# Patient Record
Sex: MALE | Race: BLACK OR AFRICAN AMERICAN
[De-identification: names, ages, dates, MRNs, and addresses within clinical notes are randomized per-mention and may not be internally consistent; named-entity substitution may affect disease eponyms.]

---

## 2017-03-07 ENCOUNTER — HOSPITAL ENCOUNTER (INPATIENT)
Dept: HOSPITAL 74 - YASAS | Age: 47
LOS: 1 days | Discharge: HOME | End: 2017-03-08
Attending: INTERNAL MEDICINE | Admitting: INTERNAL MEDICINE
Payer: COMMERCIAL

## 2017-03-07 VITALS — BODY MASS INDEX: 23.4 KG/M2

## 2017-03-07 DIAGNOSIS — F10.230: Primary | ICD-10-CM

## 2017-03-07 DIAGNOSIS — Z86.79: ICD-10-CM

## 2017-03-07 DIAGNOSIS — Z96.641: ICD-10-CM

## 2017-03-07 DIAGNOSIS — F90.9: ICD-10-CM

## 2017-03-07 DIAGNOSIS — Z87.11: ICD-10-CM

## 2017-03-07 DIAGNOSIS — Z95.5: ICD-10-CM

## 2017-03-07 DIAGNOSIS — F17.210: ICD-10-CM

## 2017-03-07 DIAGNOSIS — I10: ICD-10-CM

## 2017-03-07 DIAGNOSIS — F19.24: ICD-10-CM

## 2017-03-07 DIAGNOSIS — F91.8: ICD-10-CM

## 2017-03-07 DIAGNOSIS — I50.42: ICD-10-CM

## 2017-03-07 DIAGNOSIS — K21.9: ICD-10-CM

## 2017-03-07 DIAGNOSIS — Z86.2: ICD-10-CM

## 2017-03-07 LAB
APPEARANCE UR: CLEAR
BILIRUB UR STRIP.AUTO-MCNC: NEGATIVE MG/DL
COLOR UR: (no result)
KETONES UR QL STRIP: NEGATIVE
LEUKOCYTE ESTERASE UR QL STRIP.AUTO: NEGATIVE
MUCOUS THREADS URNS QL MICRO: (no result)
NITRITE UR QL STRIP: NEGATIVE
PH UR: 6 [PH] (ref 5–8)
PROT UR QL STRIP: (no result)
PROT UR QL STRIP: NEGATIVE
RBC # BLD AUTO: <1 /HPF (ref 0–3)
RBC # UR STRIP: NEGATIVE /UL
SP GR UR: 1.02 (ref 1–1.03)
UROBILINOGEN UR STRIP-MCNC: NEGATIVE E.U./DL (ref 0.2–1)
WBC # UR AUTO: 2 /HPF (ref 3–5)

## 2017-03-07 PROCEDURE — HZ2ZZZZ DETOXIFICATION SERVICES FOR SUBSTANCE ABUSE TREATMENT: ICD-10-PCS | Performed by: INTERNAL MEDICINE

## 2017-03-07 RX ADMIN — CARVEDILOL SCH: 25 TABLET, FILM COATED ORAL at 23:14

## 2017-03-07 RX ADMIN — CARVEDILOL SCH MG: 25 TABLET, FILM COATED ORAL at 14:53

## 2017-03-07 NOTE — CONSULT
[facility] Psychiatric Consult





- Data


Date of interview: 03/08/17


Admission source: [facility]


Identifying data: This is 47 years old male, AMBULATING WITH CANE,  with 

psychiatric hospitalization history intoxicated with;.  Alcohol and Nicotine


Substance Abuse History: Smoking Cessation.  Smoking history: Current every day 

smoker.  Have you smoked in the past 12 months: Yes.  Aproximately how many 

cigarettes per day: 10.  Hx Chewing Tobacco Use: No.  Initiated information on 

smoking cessation: Yes.  'Breaking Loose' booklet given: 03/07/17.  - Substance 

& Tx. History.  Hx Alcohol Use: Yes.  Hx Substance Use: No.  Substance Use Type

: Alcohol.  Hx Substance Use Treatment: Yes (detox).  - Substances Abused.  ** 

Alcohol-beer/vodka.  Route: Oral.  Frequency: Daily.  Amount used: 2-6 pks./1 

pt.  Age of first use: 8.  Date of Last Use: 03/07/17


Medical History: CHF, HTN, lower extremities injures


Psychiatric History: Patient reports history of depressionl preoccupied with 

psychiatric medications, reports taking prior to admission:  Elavil 25mg po9 

bid.  Lexapro 20mg poqd.  Gabapentin 400mg po bid.  [id number] po qhs.  

Trazodone 200mg po qhs


Physical/Sexual Abuse/Trauma History: Denies


Additional Comment: Elavil 25mg po9 bid.  Lexapro 20mg poqd.  Gabapentin 400mg 

po bid.  [id number] po qhs.  Trazodone 200mg po qhs





Mental Status Exam





- Mental Status Exam


Alert and Oriented to: Person


Cognitive Function: Fair


Patient Appearance: Unkempt


Mood: Anxious


Affect: Mood Congruent


Patient Behavior: Impulsive, Talkative


Speech Pattern: Excessive


Voice Loudness: Mildly Loud


Thought Process: Circumstantial, Goal Oriented


Thought Disorder: Being Controlled


Hallucinations: Denies


Suicidal Ideation: Denies


Homicidal Ideation: Denies


Insight/Judgement: Fair


Sleep: Difficulty falling asleep


Appetite: Weight loss


Muscle strength/Tone: Mild Hypotonicity


Gait/Station: Hemiparetic


Additional Comments: Elavil 25mg po9 bid.  Lexapro 20mg poqd.  Gabapentin 400mg 

po bid.  [id number] po qhs.  Trazodone 200mg po qhs





Psychiatric Findings





- Problem List (Axis 1, 2,3)


(1) Alcohol dependence with uncomplicated withdrawal


Current Visit: Yes   Status: Acute





(2) Nicotine dependence


Current Visit: Yes   Status: Acute   





(3) Drug-induced mood disorder


Current Visit: Yes   Status: Acute





(4) ADHD (attention deficit hyperactivity disorder)


Current Visit: Yes   Status: Acute   








- Initial Treatment Plan


Initial Treatment Plan: Elavil 25mg po9 bid.  Lexapro 20mg poqd.  Gabapentin 

400mg po bid.  [id number] po qhs.  Trazodone 200mg po qhs

## 2017-03-07 NOTE — HP
CIWA Score





- CIWA Score


Nausea/Vomitin-No Nausea/No Vomiting


Muscle Tremors: 4-Moderate,w/Arms Extend


Anxiety: 3


Agitation: 4-Moderately Restless


Paroxysmal Sweats: 3


Orientation: 0-Oriented


Tacttile Disturbances: 0-None


Auditory Disturbances: 0-None


Visual Disturbances: 0-None


Headache: 0-None Present


CIWA-Ar Total Score: 14





Admission ROS [facility]





- HPI


Chief Complaint: 


Withdrawal sx.


Allergies/Adverse Reactions: 


 Allergies











Allergy/AdvReac Type Severity Reaction Status Date / Time


 


tomato Allergy Severe Swelling Verified 17 10:23


 


SEAFOOD Allergy Severe Hives Uncoded 17 10:23


 


NKDA Allergy   Uncoded 17 11:25











History of Present Illness: 


46 y/o man with a long hx. of alcoholism is admitted for detox. Pt. has been in 

previous detox,he reports 6 yrs. sober.


Exam Limitations: No Limitations





- Ebola screening


Have you traveled outside of the country in the last 21 days: No


Have you had contact with anyone from an Ebola affected area: No


Have you been sick,other than usual withdrawal symptoms: No


Do you have a fever: No





- Review of Systems


Constitutional: Diaphoresis


EENT: reports: No Symptoms Reported


Respiratory: reports: No Symptoms reported


Cardiac: reports: Palpitations (pulse 110)


GI: reports: Nausea, Abdominal cramping


: reports: No Symptoms Reported


Musculoskeletal: reports: Back Pain, Joint Pain, Muscle Pain


Integumentary: reports: Sweating


Neuro: reports: Tremors


Endocrine: reports: No Symptoms Reported


Hematology: reports: No Symptoms Reported


Psychiatric: reports: No Sypmtoms Reported


Other Systems: Reviewed and Negative





Patient History





- Patient Medical History


Hx Anemia: Yes (was on feosol)


Hx Asthma: No


Hx Chronic Obstructive Pulmonary Disease (COPD): No


Hx Cancer: No


Hx Cardiac Disorders: Yes (stent in the descending aorta because of aneurysm)


Hx Congestive Heart Failure: Yes


Hx Hypertension: Yes


Hx Hypercholesterolemia: Yes (no meds)


Hx Pacemaker: No


HX Cerebrovascular Accident: No


Hx Seizures: No


Hx Dementia: No


Hx Diabetes: No


Hx Gastrointestinal Disorders: Yes (stomach ulcer/acid reflux)


Hx Liver Disease: No


Hx Genitourinary Disorders: No


Hx Sexually Transmitted Disorders: No


Hx Renal Disease (ESRD): No


Hx Thyroid Disease: No


Hx Human Immunodeficiency Virus (HIV): No


Hx Hepatitis C: No


Hx Depression: Yes (lexapro)


Hx Suicide Attempt: No


Hx Bipolar Disorder: No


Hx Schizophrenia: No





- Patient Surgical History


Past Surgical History: Yes


Hx Neurologic Surgery: No


Hx Cataract Extraction: No


Hx Cardiac Surgery: Yes (Stent in descending aorta)


Hx Lung Surgery: No


Hx Breast Surgery: No


Hx Breast Biopsy: No


Hx Abdominal Surgery: No


Hx Appendectomy: No


Hx Cholecystectomy: No


Hx Genitourinary Surgery: No


Hx  Section: No


Hx Orthopedic Surgery: Yes (left hip replacement, right knee/leg, right/arm/

right small toe x2)


Anesthesia Reaction: No





- PPD History


Previous Implant?: Yes


Documented Results: Positive w/o proof


Implanted On Prior [facility] Admission?: No


PPD to be Administered?: No





- Smoking Cessation


Smoking history: Current every day smoker


Have you smoked in the past 12 months: Yes


Aproximately how many cigarettes per day: 10


Hx Chewing Tobacco Use: No


Initiated information on smoking cessation: Yes


'Breaking Loose' booklet given: 17





- Substance & Tx. History


Hx Alcohol Use: Yes


Hx Substance Use: No


Substance Use Type: Alcohol


Hx Substance Use Treatment: Yes (detox)





- Substances Abused


  ** Alcohol-beer/vodka


Route: Oral


Frequency: Daily


Amount used: 2-6 pks./1 pt.


Age of first use: 8


Date of Last Use: 17





Family Disease History





- Family Disease History


Family Disease History: Diabetes: Father (HTN), Heart Disease: Father





Admission Physical Exam [facility]





- Vital Signs


Vital Signs: 


 Vital Signs - 24 hr











  17





  09:49


 


Temperature 97.2 F L


 


Pulse Rate 110 H


 


Respiratory 20





Rate 


 


Blood Pressure 125/92














- Physical


General Appearance: Yes: Tremorous, Sweating, Anxious


HEENTM: Yes: Within Normal Limits


Respiratory: Yes: Chest Non-Tender, Lungs Clear, Normal Breath Sounds


Neck: Yes: Supple


Breast: Yes: Breast Exam Deferred


Cardiology: Yes: Regular Rhythm, Regular Rate, S1, S2


Abdominal: Yes: Normal Bowel Sounds, Non Tender, Soft


Genitourinary: Yes: Within Normal Limits


Back: Yes: Within Normal Limits


Musculoskeletal: Yes: Within Normal Limits


Extremities: Yes: Tremors, Other (multiple surgical scars)


Neurological: Yes: Fully Oriented, Alert


Integumentary: Yes: Diaphoresis


Lymphatic: Yes: Within Normal Limits





- Diagnostic


(1) Alcohol dependence with uncomplicated withdrawal


Current Visit: Yes   Status: Acute





(2) CHF (congestive heart failure)


Current Visit: Yes   Status: Acute   Qualifiers: 


     Congestive heart failure type: combined     Congestive heart failure 

chronicity: chronic        Qualified Code(s): I50.42 - Chronic combined 

systolic (congestive) and diastolic (congestive) heart failure  





(3) HTN (hypertension)


Current Visit: Yes   Status: Acute   Qualifiers: 


     Hypertension type: essential hypertension        Qualified Code(s): I10 - 

Essential (primary) hypertension  








Cleared for Admission [facility]





- Detox or Rehab


[facility] Level of Care: Medically Managed


Detox Regimen/Protocol: Librium





[facility] Breath Alcohol Content


Breath Alcohol Content: 0





Urine Drug Screen





- Results


Drug Screen Negative: Yes

## 2017-03-08 VITALS — DIASTOLIC BLOOD PRESSURE: 93 MMHG | SYSTOLIC BLOOD PRESSURE: 148 MMHG | TEMPERATURE: 98.8 F | HEART RATE: 92 BPM

## 2017-03-08 LAB
ALBUMIN SERPL-MCNC: 3.4 G/DL (ref 3.4–5)
ALP SERPL-CCNC: 121 U/L (ref 45–117)
ALT SERPL-CCNC: 10 U/L (ref 12–78)
ANION GAP SERPL CALC-SCNC: 7 MMOL/L (ref 8–16)
AST SERPL-CCNC: 10 U/L (ref 15–37)
BILIRUB SERPL-MCNC: 0.2 MG/DL (ref 0.2–1)
CALCIUM SERPL-MCNC: 9.4 MG/DL (ref 8.5–10.1)
CO2 SERPL-SCNC: 28 MMOL/L (ref 21–32)
CREAT SERPL-MCNC: 1.5 MG/DL (ref 0.7–1.3)
DEPRECATED RDW RBC AUTO: 18.9 % (ref 11.9–15.9)
GLUCOSE SERPL-MCNC: 42 MG/DL (ref 74–106)
MCH RBC QN AUTO: 24 PG (ref 25.7–33.7)
MCHC RBC AUTO-ENTMCNC: 31.1 G/DL (ref 32–35.9)
MCV RBC: 77.2 FL (ref 80–96)
PLATELET # BLD AUTO: 726 K/MM3 (ref 134–434)
PMV BLD: 7.5 FL (ref 7.5–11.1)
PROT SERPL-MCNC: 7.9 G/DL (ref 6.4–8.2)
SICKLE CELL SCREEN: NEGATIVE
WBC # BLD AUTO: 6.9 K/MM3 (ref 4–10)

## 2017-03-08 NOTE — EKG
Test Reason : 

Blood Pressure : ***/*** mmHG

Vent. Rate : 084 BPM     Atrial Rate : 084 BPM

   P-R Int : 164 ms          QRS Dur : 080 ms

    QT Int : 386 ms       P-R-T Axes : 062 048 014 degrees

   QTc Int : 456 ms

 

NORMAL SINUS RHYTHM

POSSIBLE LEFT ATRIAL ENLARGEMENT

BORDERLINE ECG

NO PREVIOUS ECGS AVAILABLE

Confirmed by FER GREEN MD ([id number]) on 3/8/2017 12:52:07 PM

 

Referred By:             Confirmed By:FER GREEN MD

## 2017-03-08 NOTE — PN
[facility] Progress Note


Note: 


pt became irrate screaming yelling at entire staff non approachable; security 

called pt escorted off the unit; administrative discharge

## 2017-03-08 NOTE — DS
[facility] Detox Discharge Summary


Admission Date: 


03/07/17





Discharge Date: 03/08/17 (non compliant )





- History


Present History: Alcohol Dependence





- Physical Exam Results


Vital Signs: 


 Vital Signs











Temperature  98.8 F   03/08/17 10:11


 


Pulse Rate  92 H  03/08/17 10:11


 


Respiratory Rate  20   03/08/17 10:11


 


Blood Pressure  148/93   03/08/17 10:11


 


O2 Sat by Pulse Oximetry (%)      














- Medication


Discharge Medications: 


Ambulatory Orders





Amitriptyline HCl [Elavil -] 25 mg PO HS 03/07/17 


Amlodipine Besylate [Norvasc -] 10 mg PO DAILY 03/07/17 


Aspirin [ASA -] 81 mg PO DAILY 03/07/17 


Carvedilol [Coreg -] 25 mg PO BID 03/07/17 


Diphenhydramine [Benadryl -] 50 mg PO TID 03/07/17 


Escitalopram Oxalate [Lexapro -] 20 mg PO DAILY 03/07/17 


Gabapentin [Neurontin -] 400 mg PO BID 03/07/17 


Hydralazine HCl [Apresoline -] 25 mg PO TID 03/07/17 


Hydrochlorothiazide [Hctz -] 25 mg PO BID 03/07/17 


Pantoprazole Sodium [Protonix -] 40 mg PO DAILY 03/07/17 


Trazodone HCl [Desyrel -] 100 mg PO HS 03/07/17 


Zolpidem Tartrate [Ambien] 10 mg PO HS 03/07/17 


Amitriptyline HCl [Elavil -] 25 mg PO BID #60 tablet 03/08/17 


Escitalopram Oxalate [Lexapro -] 20 mg PO DAILY #30 tablet 03/08/17 


Gabapentin [Neurontin -] 400 mg PO BID #60 capsule 03/08/17 


Trazodone HCl [Desyrel -] 200 mg PO HS #30 tablet 03/08/17 


Zolpidem Tartrate [Ambien] 10 mg PO HS #14 tablet MDD 10 03/08/17 











- Diagnosis


(1) ADHD (attention deficit hyperactivity disorder)


Current Visit: Yes   Status: Acute   





(2) Alcohol dependence with uncomplicated withdrawal


Current Visit: Yes   Status: Chronic





(3) CHF (congestive heart failure)


Current Visit: Yes   Status: Acute   Qualifiers: 


     Congestive heart failure type: combined     Congestive heart failure 

chronicity: chronic        Qualified Code(s): I50.42 - Chronic combined 

systolic (congestive) and diastolic (congestive) heart failure  





(4) Drug-induced mood disorder


Current Visit: Yes   Status: Acute





(5) HTN (hypertension)


Current Visit: Yes   Status: Chronic   Qualifiers: 


     Hypertension type: essential hypertension        Qualified Code(s): I10 - 

Essential (primary) hypertension  





(6) Nicotine dependence


Current Visit: Yes   Status: Chronic   Qualifiers: 


     Nicotine product type: cigarettes     Substance use status: uncomplicated 

       Qualified Code(s): F17.210 - Nicotine dependence, cigarettes, 

uncomplicated  








- AMA


Did Patient Leave Against Medical Advice: No

## 2018-03-09 ENCOUNTER — HOSPITAL ENCOUNTER (INPATIENT)
Dept: HOSPITAL 74 - YASAS | Age: 48
LOS: 4 days | Discharge: ANOTHER HOSPITAL | DRG: 773 | End: 2018-03-13
Attending: INTERNAL MEDICINE | Admitting: INTERNAL MEDICINE
Payer: COMMERCIAL

## 2018-03-09 VITALS — BODY MASS INDEX: 20.3 KG/M2

## 2018-03-09 DIAGNOSIS — F32.9: ICD-10-CM

## 2018-03-09 DIAGNOSIS — E78.00: ICD-10-CM

## 2018-03-09 DIAGNOSIS — D72.829: ICD-10-CM

## 2018-03-09 DIAGNOSIS — Z86.79: ICD-10-CM

## 2018-03-09 DIAGNOSIS — I50.9: ICD-10-CM

## 2018-03-09 DIAGNOSIS — Z96.642: ICD-10-CM

## 2018-03-09 DIAGNOSIS — G47.00: ICD-10-CM

## 2018-03-09 DIAGNOSIS — F10.230: Primary | ICD-10-CM

## 2018-03-09 DIAGNOSIS — I10: ICD-10-CM

## 2018-03-09 DIAGNOSIS — F12.20: ICD-10-CM

## 2018-03-09 DIAGNOSIS — Z91.013: ICD-10-CM

## 2018-03-09 DIAGNOSIS — F11.20: ICD-10-CM

## 2018-03-09 DIAGNOSIS — M06.9: ICD-10-CM

## 2018-03-09 DIAGNOSIS — D64.9: ICD-10-CM

## 2018-03-09 DIAGNOSIS — Z59.0: ICD-10-CM

## 2018-03-09 DIAGNOSIS — F17.210: ICD-10-CM

## 2018-03-09 DIAGNOSIS — F19.24: ICD-10-CM

## 2018-03-09 DIAGNOSIS — Z95.5: ICD-10-CM

## 2018-03-09 LAB
ALBUMIN SERPL-MCNC: 3.7 G/DL (ref 3.4–5)
ALP SERPL-CCNC: 132 U/L (ref 45–117)
ALT SERPL-CCNC: 7 U/L (ref 12–78)
ANION GAP SERPL CALC-SCNC: 10 MMOL/L (ref 8–16)
APPEARANCE UR: (no result)
AST SERPL-CCNC: 13 U/L (ref 15–37)
BACTERIA #/AREA URNS HPF: (no result) /HPF
BILIRUB SERPL-MCNC: 0.4 MG/DL (ref 0.2–1)
BILIRUB UR STRIP.AUTO-MCNC: NEGATIVE MG/DL
BUN SERPL-MCNC: 27 MG/DL (ref 7–18)
CALCIUM SERPL-MCNC: 8.8 MG/DL (ref 8.5–10.1)
CHLORIDE SERPL-SCNC: 102 MMOL/L (ref 98–107)
CO2 SERPL-SCNC: 25 MMOL/L (ref 21–32)
COLOR UR: YELLOW
CREAT SERPL-MCNC: 2.4 MG/DL (ref 0.7–1.3)
DEPRECATED RDW RBC AUTO: 20.1 % (ref 11.9–15.9)
EPITH CASTS URNS QL MICRO: (no result) /HPF
GLUCOSE SERPL-MCNC: 72 MG/DL (ref 74–106)
HCT VFR BLD CALC: 33.6 % (ref 35.4–49)
HGB BLD-MCNC: 11 GM/DL (ref 11.7–16.9)
KETONES UR QL STRIP: NEGATIVE
LEUKOCYTE ESTERASE UR QL STRIP.AUTO: NEGATIVE
MCH RBC QN AUTO: 24.7 PG (ref 25.7–33.7)
MCHC RBC AUTO-ENTMCNC: 32.7 G/DL (ref 32–35.9)
MCV RBC: 75.4 FL (ref 80–96)
MUCOUS THREADS URNS QL MICRO: (no result)
NITRITE UR QL STRIP: NEGATIVE
PH UR: 5 [PH] (ref 5–8)
PLATELET # BLD AUTO: 593 K/MM3 (ref 134–434)
PMV BLD: 7.5 FL (ref 7.5–11.1)
POTASSIUM SERPLBLD-SCNC: 4.1 MMOL/L (ref 3.5–5.1)
PROT SERPL-MCNC: 7.6 G/DL (ref 6.4–8.2)
PROT UR QL STRIP: (no result)
PROT UR QL STRIP: NEGATIVE
RBC # BLD AUTO: 4.45 M/MM3 (ref 4–5.6)
RBC # UR STRIP: NEGATIVE /UL
SODIUM SERPL-SCNC: 137 MMOL/L (ref 136–145)
SP GR UR: 1.02 (ref 1–1.03)
UROBILINOGEN UR STRIP-MCNC: NEGATIVE MG/DL (ref 0.2–1)
WBC # BLD AUTO: 5.1 K/MM3 (ref 4–10)

## 2018-03-09 PROCEDURE — HZ2ZZZZ DETOXIFICATION SERVICES FOR SUBSTANCE ABUSE TREATMENT: ICD-10-PCS | Performed by: INTERNAL MEDICINE

## 2018-03-09 RX ADMIN — Medication SCH MG: at 21:27

## 2018-03-09 RX ADMIN — GABAPENTIN SCH MG: 400 CAPSULE ORAL at 21:30

## 2018-03-09 RX ADMIN — TRAZODONE HYDROCHLORIDE SCH MG: 50 TABLET ORAL at 21:31

## 2018-03-09 RX ADMIN — ACETAMINOPHEN PRN MG: 325 TABLET ORAL at 21:28

## 2018-03-09 RX ADMIN — ZOLPIDEM TARTRATE SCH MG: 10 TABLET, FILM COATED ORAL at 21:38

## 2018-03-09 RX ADMIN — CARVEDILOL SCH MG: 25 TABLET, FILM COATED ORAL at 21:31

## 2018-03-09 SDOH — ECONOMIC STABILITY - HOUSING INSECURITY: HOMELESSNESS: Z59.0

## 2018-03-09 NOTE — HP
COWS





- Scale


Resting Pulse: 1= OH 


Sweatin=Flushed/Facial Moisture


Restless Observation: 1= Difficult to Sit Still


Pupil Size: 1= Pupils >than Normal


Bone or Joint Aches: 2= Severe Diffuse Aches


Runny Nose/ Eye Tearin= Nasal Congestion


GI Upset > 30mins: 2= Nausea/Diarrhea


Tremor Observation: 2= Slight Tremor Visible


Yawning Observation: 0= None


Anxiety or Irritability: 1=Feels Anxious/Irritable


Goose Flesh Skin: 0=Smooth Skin


COWS Score: 13





CIWA Score





- CIWA Score


Nausea/Vomiting: 3


Muscle Tremors: 3


Anxiety: 2


Agitation: 2


Paroxysmal Sweats: 2


Orientation: 0-Oriented


Tacttile Disturbances: 2-Mild Itch/Numbness/Burn


Auditory Disturbances: 0-None


Visual Disturbances: 0-None


Headache: 2-Mild


CIWA-Ar Total Score: 16





Admission ROS S





- HPI


Chief Complaint: 





I need to stop using alcohol. 


Allergies/Adverse Reactions: 


 Allergies











Allergy/AdvReac Type Severity Reaction Status Date / Time


 


tomato Allergy Severe Swelling Verified 18 14:12


 


SEAFOOD Allergy Severe Hives Uncoded 18 14:12


 


NKDA Allergy   Uncoded 18 14:12











History of Present Illness: 





Pt with h/o alcohol dependency needs detox.  


Exam Limitations: No Limitations





- Ebola screening


Have you traveled outside of the country in the last 21 days: No (N)


Have you had contact with anyone from an Ebola affected area: No


Have you been sick,other than usual withdrawal symptoms: No


Do you have a fever: No





- Review of Systems


Constitutional: Loss of Appetite, Malaise, Night Sweats, Changes in sleep, 

Unintentional Wgt. Loss


EENT: reports: Blurred Vision, Dental Problems (missing teeth)


Respiratory: reports: No Symptoms reported


Cardiac: reports: Other (chf, s/p descending aorta stent)


GI: reports: Nausea, Poor Appetite, Indigestion


: reports: No Symptoms Reported


Musculoskeletal: reports: Joint Pain, Muscle Pain, Muscle Weakness, Joint 

Stiffness


Integumentary: reports: Dryness, Sweating


Neuro: reports: Headache, Tremors, Weakness


Endocrine: reports: No Symptoms Reported


Hematology: reports: Anemia


Psychiatric: reports: Orientated x3, Anxious, Depressed


Other Systems: Reviewed and Negative





Patient History





- Patient Medical History


Hx Anemia: Yes (was on feosol)


Hx Asthma: No


Hx Chronic Obstructive Pulmonary Disease (COPD): No


Hx Cancer: No


Hx Cardiac Disorders: Yes (stent in the descending aorta because of aneurysm)


Hx Congestive Heart Failure: Yes


Hx Hypertension: Yes


Hx Hypercholesterolemia: Yes (no meds)


Hx Pacemaker: No


HX Cerebrovascular Accident: No


Hx Seizures: No


Hx Dementia: No


Hx Diabetes: No


Hx Gastrointestinal Disorders: Yes (stomach ulcer/acid reflux)


Hx Liver Disease: No


Hx Genitourinary Disorders: No


Hx Sexually Transmitted Disorders: No


Hx Renal Disease (ESRD): No


Hx Thyroid Disease: No


Hx Human Immunodeficiency Virus (HIV): No


Hx Hepatitis C: No


Hx Depression: Yes (lexapro)


Hx Suicide Attempt: No


Hx Bipolar Disorder: Yes


Hx Schizophrenia: No





- Patient Surgical History


Past Surgical History: Yes


Hx Neurologic Surgery: No


Hx Cataract Extraction: No


Hx Cardiac Surgery: Yes (Stent in descending aorta)


Hx Lung Surgery: No


Hx Breast Surgery: No


Hx Breast Biopsy: No


Hx Abdominal Surgery: No


Hx Appendectomy: No


Hx Cholecystectomy: No


Hx Genitourinary Surgery: No


Hx  Section: No


Hx Orthopedic Surgery: Yes (left hip replacement, right knee/leg, right/arm/

right small toe x2)


Other Surgical History: aneurysm (aorta)


Anesthesia Reaction: No





- PPD History


Previous Implant?: Yes


Documented Results: Positive w/o proof


Implanted On Prior [facility] Admission?: No


PPD to be Administered?: No





- Reproductive History


Patient is a Female of Child Bearing Age (11 -55 yrs old): No





- Smoking Cessation


Smoking history: Current every day smoker


Have you smoked in the past 12 months: Yes


Aproximately how many cigarettes per day: 10


Cigars Per Day: 0


Hx Chewing Tobacco Use: No


Initiated information on smoking cessation: Yes


'Breaking Loose' booklet given: 18





- Substance & Tx. History


Hx Alcohol Use: Yes


Hx Substance Use: Yes


Substance Use Type: Alcohol, Cocaine, Opiates


Hx Substance Use Treatment: Yes





- Substances Abused


  ** Alcohol-garland/beer


Route: Oral


Frequency: Daily


Amount used: 2 pts./8-12 (16 oz.)


Age of first use: 8


Date of Last Use: 18





  ** Oxycodone (prescribed)


Route: Oral


Frequency: Daily


Amount used: 30 mg. QID


Age of first use: 43


Date of Last Use: 18





Family Disease History





- Family Disease History


Family Disease History: Diabetes: Father (HTN), Heart Disease: Father





Admission Physical Exam [facility]





- Vital Signs


Vital Signs: 


 Vital Signs - 24 hr











  18





  11:45


 


Temperature 97.2 F L


 


Pulse Rate 86


 


Respiratory 20





Rate 


 


Blood Pressure 162/91














- Physical


General Appearance: Yes: Disheveled, Thin, Anxious, Other (ambulates with a cane

)


HEENTM: Yes: EOMI, Hearing grossly Normal, DAE, Other (multiple missing teeth 

others in poor repair)


Respiratory: Yes: Chest Non-Tender, Lungs Clear, Normal Breath Sounds, No 

Respiratory Distress


Neck: Yes: Supple


Breast: Yes: Within Normal Limits


Cardiology: Yes: Regular Rhythm, Regular Rate, S1, S2


Abdominal: Yes: Non Tender, Flat, Soft, Increased Bowel Sounds


Genitourinary: Yes: Within Normal Limits


Back: Yes: Within Normal Limits


Musculoskeletal: Yes: Back pain, Joint Stiffness, Muscle Pain, Muscle weakness, 

Other (multiple well healed scars rt forearm, left hip, rt knee and leg. 

Multiple joint deformities hands /digits dora.)


Extremities: Yes: Tremors


Neurological: Yes: CNs II-XII NML intact, Fully Oriented, Alert, Normal Mood/

Affect


Integumentary: Yes: Moist


Lymphatic: Yes: Within Normal Limits





- Diagnostic


(1) Opioid dependence


Current Visit: Yes   Status: Chronic   


Qualifiers: 


   Substance use status: uncomplicated   Qualified Code(s): F11.20 - Opioid 

dependence, uncomplicated   





(2) Status post left hip replacement


Current Visit: No   Status: Chronic   





(3) Rheumatoid arthritis


Current Visit: Yes   Status: Chronic   


Qualifiers: 


   Rheumatoid arthritis location: multiple sites 





(4) CHF (congestive heart failure)


Current Visit: No   Status: Chronic   


Qualifiers: 


   Qualified Code(s): I50.42 - Chronic combined systolic (congestive) and 

diastolic (congestive) heart failure   





(5) Alcohol dependence with uncomplicated withdrawal


Current Visit: Yes   Status: Chronic   





(6) HTN (hypertension)


Current Visit: Yes   Status: Chronic   


Qualifiers: 


   Hypertension type: essential hypertension   Qualified Code(s): I10 - 

Essential (primary) hypertension   





(7) Nicotine dependence


Current Visit: Yes   Status: Chronic   


Qualifiers: 


   Nicotine product type: cigarettes   Substance use status: uncomplicated   

Qualified Code(s): F17.210 - Nicotine dependence, cigarettes, uncomplicated   





(8) Ascending aortic aneurysm


Current Visit: Yes   Status: Acute   





(9) History of repair of dissecting aneurysm of descending thoracic aorta


Current Visit: No   Status: Resolved   





Cleared for Admission [facility]





- Detox or Rehab


[facility] Level of Care: Medically Managed


Detox Regimen/Protocol: Methadone/Librium





[facility] Breath Alcohol Content


Breath Alcohol Content: 0





Urine Drug Screen





- Results


Drug Screen Negative: No


Urine Drug Screen Results: KRISTIE-Cocaine, TCA-Tricyclic Antidepress, OXY-Oxycodone

## 2018-03-09 NOTE — CONSULT
[facility] Psychiatric Consult





- Data


Date of interview: 03/09/18


Admission source: [facility]


Identifying data: Readmission to [facility] for this 47 y/o AA male seeking 

detox treatment on [address] for alcohol,cocaine and opiate dependence.Patient is 

,no children,domiciled (lives with brother),unemployed,disabled and 

supported on SSI benefits.


Substance Abuse History: Discussed with patient in this interview.Mr Sarmiento 

endorses daily consumption of garland (2-3 pints daily, depending on available 

funds) + 8-12 X 16 oz of beer.For years.Uses crack three times a week but 

admits to a recent binge of crack (allegedly spent 500 dollars worth of the 

substance in past 3 days). Details in this imported [facility] report : Smoking history

: Current every day smoker.  Have you smoked in the past 12 months: Yes.  

Aproximately how many cigarettes per day: 10.  Hx Chewing Tobacco Use: No.  

Initiated information on smoking cessation: Yes.  'Breaking Loose' booklet given

: 03/07/17.  - Substance & Tx. History.  Hx Alcohol Use: Yes.  Hx Substance Use

: No.  Substance Use Type: Alcohol.  Hx Substance Use Treatment: Yes (detox).  

- Substances Abused.  ** Alcohol-beer/vodka.  Route: Oral.  Frequency: Daily.  

Amount used: 2-6 pks./1 pt.  Age of first use: 8.  Date of Last Use: 03/07/17


Medical History: Multiple medical co-morbidities : congestive heart failure,

rheumatoid arthritis,chronic pain syndrome,past history of osteomyelitis (right 

leg and toes),gastric ulcer,antecedent of left hip replacement,orthosurgery (

right knee + right forearm),residual contracture of right forearm (surgical 

scars),weight loss and a recent history of cardiac stent (dissecting aneurysm 

of the descending thoracic aorta).Mr Sarmiento ambulates with a cane.


Psychiatric History: Patient admits to one psychiatric hospitalization at [facility] (July 2017).Diagnosed with MDD,Anxiety Disorder and " 

some bipolar issues ".Prescribed a regimen of trazodone 100 mg/hs + lexapro 20 

mg/day + amitryptiline 25 mg/hs + ambien 10 mg/hs.Mr Sarmiento goes to the 

[facility] clinic,in the [place], for his psychiatric 

aftercare.He indicates that he last took his medications 2-3 days prior to this 

[facility] visit.Patient denies history of suicide attempts.


Physical/Sexual Abuse/Trauma History: Patient denies history of abuse.


Additional Comment: Urine Drug Screen Results: KRISTIE-Cocaine, TCA-Tricyclic 

Antidepressant, OXY-Oxycodone.Noted.





Mental Status Exam





- Mental Status Exam


Alert and Oriented to: Time, Place, Person


Cognitive Function: Good


Patient Appearance: Well Groomed (thin habitus,cheloid scars on right wrist/

forearm)


Mood: Hopeful (calm), Euthymic


Affect: Appropriate, Normal Range


Patient Behavior: Fatigued, Talkative, Appropriate, Cooperative


Speech Pattern: Clear, Appropriate


Voice Loudness: Normal


Thought Process: Goal Oriented


Thought Disorder: Not Present


Hallucinations: Denies


Suicidal Ideation: Denies


Homicidal Ideation: Denies


Insight/Judgement: Poor


Sleep: Poorly, Difficulty falling asleep


Appetite: Good


Gait/Station: Other (walks with a cane ; unsteady gait due to orthopedic issues)





Psychiatric Findings





- Problem List (Axis 1, 2,3)


(1) Alcohol dependence with uncomplicated withdrawal


Current Visit: Yes   Status: Chronic   





(2) Opioid dependence


Current Visit: Yes   Status: Chronic   


Qualifiers: 


   Substance use status: uncomplicated   Qualified Code(s): F11.20 - Opioid 

dependence, uncomplicated   





(3) Cocaine dependence


Current Visit: Yes   Status: Acute   





(4) Nicotine dependence


Current Visit: Yes   Status: Acute   


Qualifiers: 


   Nicotine product type: cigarettes   Substance use status: uncomplicated   

Qualified Code(s): F17.210 - Nicotine dependence, cigarettes, uncomplicated   





(5) Drug-induced mood disorder


Current Visit: Yes   Status: Suspected   





(6) Depressive disorder


Current Visit: Yes   Status: Chronic   Comment: As per self-report and existing 

records.On medications.OPD care at [facility].   





(7) Insomnia


Current Visit: Yes   Status: Acute   





- Initial Treatment Plan


Initial Treatment Plan: Records revisited.Psychoeducation and support provided 

in this session.Sleep hygiene.Falls precautions.Detoxification is 

initiated.Orientation to unit : done by nursing team.Patient is advised to 

attend to / participate in groups,community meetings and recreational 

activities occurring in this hospital course.He agrees.Medications reconciled : 

trazodone 50 mg po hs (reduced) + lexapro 20 mg po daily + elavil 25 mg /hs (

temporarily withdrawn) + ambien 10 mg po hs prn.Side effects/benefits of each 

drug are discussed with the patient.Mr Sarmiento is made aware of the risk for 

parasomnias (sleep-walking),sexual dysfunction,suicidal ideation,priapism and 

oversedation.Consent (verbal) given for the inclusion of these drugs in the 

current regime of treatment.Doses will be modified as clinically 

indicated.Clinical course will be monitored on a daily basis.

## 2018-03-10 RX ADMIN — GABAPENTIN SCH MG: 400 CAPSULE ORAL at 13:46

## 2018-03-10 RX ADMIN — Medication SCH TAB: at 10:38

## 2018-03-10 RX ADMIN — ZOLPIDEM TARTRATE SCH MG: 10 TABLET, FILM COATED ORAL at 22:23

## 2018-03-10 RX ADMIN — Medication SCH APPLIC: at 22:28

## 2018-03-10 RX ADMIN — GABAPENTIN SCH MG: 400 CAPSULE ORAL at 22:22

## 2018-03-10 RX ADMIN — ACETAMINOPHEN PRN MG: 325 TABLET ORAL at 11:40

## 2018-03-10 RX ADMIN — NICOTINE SCH: 21 PATCH TRANSDERMAL at 10:44

## 2018-03-10 RX ADMIN — ESCITALOPRAM SCH MG: 20 TABLET, FILM COATED ORAL at 10:37

## 2018-03-10 RX ADMIN — ASPIRIN 81 MG SCH MG: 81 TABLET ORAL at 10:40

## 2018-03-10 RX ADMIN — CARVEDILOL SCH MG: 25 TABLET, FILM COATED ORAL at 10:39

## 2018-03-10 RX ADMIN — TRAZODONE HYDROCHLORIDE SCH MG: 50 TABLET ORAL at 22:23

## 2018-03-10 RX ADMIN — GABAPENTIN SCH MG: 400 CAPSULE ORAL at 05:54

## 2018-03-10 RX ADMIN — CLOTRIMAZOLE SCH APPLIC: 1 CREAM TOPICAL at 22:30

## 2018-03-10 RX ADMIN — AMLODIPINE BESYLATE SCH MG: 10 TABLET ORAL at 10:38

## 2018-03-10 RX ADMIN — CARVEDILOL SCH MG: 25 TABLET, FILM COATED ORAL at 22:23

## 2018-03-10 RX ADMIN — Medication SCH MG: at 22:22

## 2018-03-10 NOTE — PN
[facility] CIWA





- CIWA Score


Nausea/Vomitin-No Nausea/No Vomiting


Muscle Tremors: 3


Anxiety: 5


Agitation: 4-Moderately Restless


Paroxysmal Sweats: 3


Orientation: 0-Oriented


Tacttile Disturbances: 3-Moderate Itch/Numb/Burn


Auditory Disturbances: 0-None


Visual Disturbances: 0-None


Headache: 0-None Present


CIWA-Ar Total Score: 18





BHS COWS





- Scale


Resting Pulse: 1= MD 


Sweatin= Chills/Flushing


Restless Observation: 1= Difficult to Sit Still


Pupil Size: 0= Normal to Room Light


Bone or Joint Aches: 2= Severe Diffuse Aches


Runny Nose/ Eye Tearin= None


GI Upset > 30mins: 0= None


Tremor Observation of Outstretched Hands: 2= Slight Tremor Visible


Yawning Observation: 0= None


Anxiety or Irritability: 4=Extreme Anxiety


Goose Flesh Skin: 3=Piloerection


COWS Score: 14





BHS Progress Note (SOAP)


Subjective: 





Tremors, Anxious, Body Aches, Interrupted Sleep.


Objective: 


PATIENT A & O X 3, OBSERVED AMBULATING ON UNIT. NO ACUTE DISTRESS.





03/10/18 16:20


 Vital Signs











Temperature  97.7 F   03/10/18 14:10


 


Pulse Rate  82   03/10/18 14:10


 


Respiratory Rate  18   03/10/18 14:10


 


Blood Pressure  162/91   03/10/18 14:10


 


O2 Sat by Pulse Oximetry (%)      








 Laboratory Tests











  18





  15:00 15:47 15:47


 


WBC   5.1 


 


RBC   4.45 


 


Hgb   11.0 L 


 


Hct   33.6 L 


 


MCV   75.4 L 


 


MCH   24.7 L 


 


MCHC   32.7 


 


RDW   20.1 H 


 


Plt Count   593 H 


 


MPV   7.5 


 


Sodium   


 


Potassium   


 


Chloride   


 


Carbon Dioxide   


 


Anion Gap   


 


BUN   


 


Creatinine   


 


Creat Clearance w eGFR   


 


Random Glucose   


 


Calcium   


 


Total Bilirubin   


 


AST   


 


ALT   


 


Alkaline Phosphatase   


 


Total Protein   


 


Albumin   


 


Urine Color   


 


Urine Appearance   


 


Urine pH   


 


Ur Specific Gravity   


 


Urine Protein   


 


Urine Glucose (UA)   


 


Urine Ketones   


 


Urine Blood   


 


Urine Nitrite   


 


Urine Bilirubin   


 


Urine Urobilinogen   


 


Ur Leukocyte Esterase   


 


Urine WBC (Auto)   


 


Urine RBC (Auto)   


 


Ur Epithelial Cells   


 


Urine Bacteria   


 


Urine Mucus   


 


RPR Titer    Nonreactive


 


HIV 1&2 Antibody Screen  Negative  


 


HIV P24 Antigen  Negative  














  18





  15:47 22:50


 


WBC  


 


RBC  


 


Hgb  


 


Hct  


 


MCV  


 


MCH  


 


MCHC  


 


RDW  


 


Plt Count  


 


MPV  


 


Sodium  137 


 


Potassium  4.1 


 


Chloride  102 


 


Carbon Dioxide  25 


 


Anion Gap  10 


 


BUN  27 H D 


 


Creatinine  2.4 H D 


 


Creat Clearance w eGFR  28.80 


 


Random Glucose  72 L D 


 


Calcium  8.8 


 


Total Bilirubin  0.4  D 


 


AST  13 L D 


 


ALT  7 L D 


 


Alkaline Phosphatase  132 H 


 


Total Protein  7.6 


 


Albumin  3.7 


 


Urine Color   Yellow


 


Urine Appearance   Turbid


 


Urine pH   5.0


 


Ur Specific Gravity   1.024


 


Urine Protein   2+ H


 


Urine Glucose (UA)   Negative


 


Urine Ketones   Negative


 


Urine Blood   Negative


 


Urine Nitrite   Negative


 


Urine Bilirubin   Negative


 


Urine Urobilinogen   Negative


 


Ur Leukocyte Esterase   Negative


 


Urine WBC (Auto)   2


 


Urine RBC (Auto)   <1


 


Ur Epithelial Cells   Rare


 


Urine Bacteria   Rare


 


Urine Mucus   Rare


 


RPR Titer  


 


HIV 1&2 Antibody Screen  


 


HIV P24 Antigen  








labs noted.


Assessment: 





03/10/18 16:21


WITHDRAWAL SYMPTOMS.


Plan: 





CONTINUE DETOX.


[facility] TOMORROW AM FOR ADMISSION RENAL ABNORMALITIES.


D/C MAGNESIUM-CONTAINING MEDS. AND IBUPROFEN.

## 2018-03-11 RX ADMIN — ZOLPIDEM TARTRATE SCH MG: 10 TABLET, FILM COATED ORAL at 22:20

## 2018-03-11 RX ADMIN — ESCITALOPRAM SCH MG: 20 TABLET, FILM COATED ORAL at 10:47

## 2018-03-11 RX ADMIN — NICOTINE SCH: 21 PATCH TRANSDERMAL at 10:48

## 2018-03-11 RX ADMIN — Medication SCH APPLIC: at 10:48

## 2018-03-11 RX ADMIN — CLOTRIMAZOLE SCH APPLIC: 1 CREAM TOPICAL at 22:21

## 2018-03-11 RX ADMIN — TRAZODONE HYDROCHLORIDE SCH MG: 50 TABLET ORAL at 22:20

## 2018-03-11 RX ADMIN — ACETAMINOPHEN PRN MG: 325 TABLET ORAL at 23:33

## 2018-03-11 RX ADMIN — METHADONE HYDROCHLORIDE SCH MG: 5 TABLET ORAL at 10:47

## 2018-03-11 RX ADMIN — Medication SCH MG: at 22:20

## 2018-03-11 RX ADMIN — PANTOPRAZOLE SODIUM SCH MG: 40 TABLET, DELAYED RELEASE ORAL at 06:29

## 2018-03-11 RX ADMIN — CLOTRIMAZOLE SCH APPLIC: 1 CREAM TOPICAL at 10:48

## 2018-03-11 RX ADMIN — Medication SCH TAB: at 10:47

## 2018-03-11 RX ADMIN — ACETAMINOPHEN PRN MG: 325 TABLET ORAL at 11:33

## 2018-03-11 RX ADMIN — ASPIRIN 81 MG SCH MG: 81 TABLET ORAL at 10:48

## 2018-03-11 RX ADMIN — Medication SCH APPLIC: at 22:22

## 2018-03-11 RX ADMIN — CARVEDILOL SCH MG: 25 TABLET, FILM COATED ORAL at 22:21

## 2018-03-11 RX ADMIN — GABAPENTIN SCH MG: 400 CAPSULE ORAL at 06:28

## 2018-03-11 RX ADMIN — GABAPENTIN SCH MG: 400 CAPSULE ORAL at 14:14

## 2018-03-11 RX ADMIN — AMLODIPINE BESYLATE SCH MG: 10 TABLET ORAL at 10:47

## 2018-03-11 RX ADMIN — GABAPENTIN SCH MG: 400 CAPSULE ORAL at 22:20

## 2018-03-11 RX ADMIN — CARVEDILOL SCH MG: 25 TABLET, FILM COATED ORAL at 10:47

## 2018-03-11 NOTE — PN
S CIWA





- CIWA Score


Nausea/Vomiting: 3


Muscle Tremors: 3


Anxiety: 4-Mod. Anxious/Guarded


Agitation: 4-Moderately Restless


Paroxysmal Sweats: 3


Orientation: 0-Oriented


Tacttile Disturbances: 1-Very Mild Itch/Numbness


Auditory Disturbances: 0-None


Visual Disturbances: 0-None


Headache: 0-None Present


CIWA-Ar Total Score: 18





BHS COWS





- Scale


Resting Pulse: 1= NV 


Sweatin= Chills/Flushing


Restless Observation: 3= Extraneous Movement


Pupil Size: 0= Normal to Room Light


Bone or Joint Aches: 2= Severe Diffuse Aches


Runny Nose/ Eye Tearin= Runny Nose/Eyes


GI Upset > 30mins: 2= Nausea/Diarrhea


Tremor Observation of Outstretched Hands: 2= Slight Tremor Visible


Yawning Observation: 0= None


Anxiety or Irritability: 2=Irritable/Anxious


Goose Flesh Skin: 0=Smooth Skin


COWS Score: 15





BHS Progress Note (SOAP)


Subjective: 





diarrhea, interrupted sleep, anxious, sweating


Objective: 





18 13:42


 Last Vital Signs











Temp Pulse Resp BP Pulse Ox


 


 97.6 F   96 H  20   129/80    


 


 18 13:23  18 13:23  18 13:23  18 13:23   








 Laboratory Tests











  18





  15:00 15:47 15:47


 


WBC   5.1 


 


RBC   4.45 


 


Hgb   11.0 L 


 


Hct   33.6 L 


 


MCV   75.4 L 


 


MCH   24.7 L 


 


MCHC   32.7 


 


RDW   20.1 H 


 


Plt Count   593 H 


 


MPV   7.5 


 


Sodium   


 


Potassium   


 


Chloride   


 


Carbon Dioxide   


 


Anion Gap   


 


BUN   


 


Creatinine   


 


Creat Clearance w eGFR   


 


Random Glucose   


 


Calcium   


 


Total Bilirubin   


 


AST   


 


ALT   


 


Alkaline Phosphatase   


 


Total Protein   


 


Albumin   


 


Urine Color   


 


Urine Appearance   


 


Urine pH   


 


Ur Specific Gravity   


 


Urine Protein   


 


Urine Glucose (UA)   


 


Urine Ketones   


 


Urine Blood   


 


Urine Nitrite   


 


Urine Bilirubin   


 


Urine Urobilinogen   


 


Ur Leukocyte Esterase   


 


Urine WBC (Auto)   


 


Urine RBC (Auto)   


 


Ur Epithelial Cells   


 


Urine Bacteria   


 


Urine Mucus   


 


RPR Titer    Nonreactive


 


HIV 1&2 Antibody Screen  Negative  


 


HIV P24 Antigen  Negative  














  18





  15:47 22:50


 


WBC  


 


RBC  


 


Hgb  


 


Hct  


 


MCV  


 


MCH  


 


MCHC  


 


RDW  


 


Plt Count  


 


MPV  


 


Sodium  137 


 


Potassium  4.1 


 


Chloride  102 


 


Carbon Dioxide  25 


 


Anion Gap  10 


 


BUN  27 H D 


 


Creatinine  2.4 H D 


 


Creat Clearance w eGFR  28.80 


 


Random Glucose  72 L D 


 


Calcium  8.8 


 


Total Bilirubin  0.4  D 


 


AST  13 L D 


 


ALT  7 L D 


 


Alkaline Phosphatase  132 H 


 


Total Protein  7.6 


 


Albumin  3.7 


 


Urine Color   Yellow


 


Urine Appearance   Turbid


 


Urine pH   5.0


 


Ur Specific Gravity   1.024


 


Urine Protein   2+ H


 


Urine Glucose (UA)   Negative


 


Urine Ketones   Negative


 


Urine Blood   Negative


 


Urine Nitrite   Negative


 


Urine Bilirubin   Negative


 


Urine Urobilinogen   Negative


 


Ur Leukocyte Esterase   Negative


 


Urine WBC (Auto)   2


 


Urine RBC (Auto)   <1


 


Ur Epithelial Cells   Rare


 


Urine Bacteria   Rare


 


Urine Mucus   Rare


 


RPR Titer  


 


HIV 1&2 Antibody Screen  


 


HIV P24 Antigen  








Labs noted: YONATHAN, proteinuria


Assessment: 





18 13:42


Withdrawal symptoms





Noted with YONATHAN and proteinuria


Plan: 





Continue detox





YONATHAN: encouraged to drink lots of water due to dehydration (water pitcher ordered

), repeat BMP





Proteinuria: encouraged to drink more water, repeat UA

## 2018-03-11 NOTE — EKG
Test Reason : 

Blood Pressure : ***/*** mmHG

Vent. Rate : 083 BPM     Atrial Rate : 083 BPM

   P-R Int : 164 ms          QRS Dur : 088 ms

    QT Int : 410 ms       P-R-T Axes : 075 052 030 degrees

   QTc Int : 481 ms

 

NORMAL SINUS RHYTHM

POSSIBLE LEFT ATRIAL ENLARGEMENT

LEFT VENTRICULAR HYPERTROPHY

PROLONGED QT

ABNORMAL ECG

WHEN COMPARED WITH ECG OF 07-MAR-2017 14:48,

NO SIGNIFICANT CHANGE WAS FOUND

Confirmed by OFELIA ZAVALA MD ([id number]) on 3/11/2018 8:38:24 PM

 

Referred By:             Confirmed By:OFELIA ZAVALA MD

## 2018-03-12 LAB
ALBUMIN SERPL-MCNC: 2.7 G/DL (ref 3.4–5)
ALP SERPL-CCNC: 108 U/L (ref 45–117)
ALT SERPL-CCNC: 7 U/L (ref 12–78)
ANION GAP SERPL CALC-SCNC: 8 MMOL/L (ref 8–16)
AST SERPL-CCNC: 16 U/L (ref 15–37)
BILIRUB CONJ SERPL-MCNC: < 0.2 MG/DL (ref 0–0.2)
BILIRUB SERPL-MCNC: 0.3 MG/DL (ref 0.2–1)
BUN SERPL-MCNC: 40 MG/DL (ref 7–18)
CALCIUM SERPL-MCNC: 8 MG/DL (ref 8.5–10.1)
CHLORIDE SERPL-SCNC: 108 MMOL/L (ref 98–107)
CO2 SERPL-SCNC: 22 MMOL/L (ref 21–32)
CREAT SERPL-MCNC: 1.9 MG/DL (ref 0.7–1.3)
GLUCOSE SERPL-MCNC: 94 MG/DL (ref 74–106)
POTASSIUM SERPLBLD-SCNC: 4.8 MMOL/L (ref 3.5–5.1)
PROT SERPL-MCNC: 6.1 G/DL (ref 6.4–8.2)
SODIUM SERPL-SCNC: 138 MMOL/L (ref 136–145)

## 2018-03-12 RX ADMIN — CLOTRIMAZOLE SCH APPLIC: 1 CREAM TOPICAL at 12:22

## 2018-03-12 RX ADMIN — METHADONE HYDROCHLORIDE SCH MG: 5 TABLET ORAL at 12:19

## 2018-03-12 RX ADMIN — Medication SCH APPLIC: at 12:22

## 2018-03-12 RX ADMIN — NICOTINE SCH: 21 PATCH TRANSDERMAL at 12:21

## 2018-03-12 RX ADMIN — AMLODIPINE BESYLATE SCH MG: 10 TABLET ORAL at 12:20

## 2018-03-12 RX ADMIN — ASPIRIN 81 MG SCH MG: 81 TABLET ORAL at 12:20

## 2018-03-12 RX ADMIN — CARVEDILOL SCH MG: 25 TABLET, FILM COATED ORAL at 22:46

## 2018-03-12 RX ADMIN — Medication SCH APPLIC: at 22:49

## 2018-03-12 RX ADMIN — Medication SCH TAB: at 12:21

## 2018-03-12 RX ADMIN — PANTOPRAZOLE SODIUM SCH MG: 40 TABLET, DELAYED RELEASE ORAL at 07:18

## 2018-03-12 RX ADMIN — GABAPENTIN SCH: 400 CAPSULE ORAL at 14:05

## 2018-03-12 RX ADMIN — GABAPENTIN SCH MG: 400 CAPSULE ORAL at 07:17

## 2018-03-12 RX ADMIN — ESCITALOPRAM SCH MG: 20 TABLET, FILM COATED ORAL at 12:20

## 2018-03-12 RX ADMIN — CARVEDILOL SCH MG: 25 TABLET, FILM COATED ORAL at 12:20

## 2018-03-12 RX ADMIN — Medication SCH MG: at 22:46

## 2018-03-12 RX ADMIN — CLOTRIMAZOLE SCH: 1 CREAM TOPICAL at 22:50

## 2018-03-12 NOTE — PN
BHS Progress Note


Note: 





Psychiatry


Attending's note :


Discussed with NP Lawrence.


Issue : patient is sedated.


Chart reviewed.Patient seen at bedside.


Found asleep.Easily awakened.Answered to his name.


Oriented to person (recognizes writer) and place ([facility]).


Feels tired.Confined to bed due to unsteady gait.


Trazodone,lexapro,zolpidem are discontinued.


Opioid / alcohol detox protocol : caution.


Close observation.


Psychiatry will follow.

## 2018-03-12 NOTE — PN
BHS Progress Note (SOAP)


Subjective: 








Shakes


sweats


sleep disturbance


Objective: 





03/12/18 11:51


Sleeping but arousable


 Vital Signs











Temperature  97.3 F L  03/12/18 09:59


 


Pulse Rate  92 H  03/12/18 09:59


 


Respiratory Rate  16   03/12/18 09:59


 


Blood Pressure  140/90   03/12/18 09:59


 


O2 Sat by Pulse Oximetry (%)      








 Laboratory Last Values











WBC  5.1 K/mm3 (4.0-10.0)   03/09/18  15:47    


 


RBC  4.45 M/mm3 (4.00-5.60)   03/09/18  15:47    


 


Hgb  11.0 GM/dL (11.7-16.9)  L  03/09/18  15:47    


 


Hct  33.6 % (35.4-49)  L  03/09/18  15:47    


 


MCV  75.4 fl (80-96)  L  03/09/18  15:47    


 


MCH  24.7 pg (25.7-33.7)  L  03/09/18  15:47    


 


MCHC  32.7 g/dl (32.0-35.9)   03/09/18  15:47    


 


RDW  20.1 % (11.9-15.9)  H  03/09/18  15:47    


 


Plt Count  593 K/MM3 (134-434)  H  03/09/18  15:47    


 


MPV  7.5 fl (7.5-11.1)   03/09/18  15:47    


 


Sodium  138 mmol/L (136-145)   03/12/18  07:00    


 


Potassium  4.8 mmol/L (3.5-5.1)   03/12/18  07:00    


 


Chloride  108 mmol/L ()  H  03/12/18  07:00    


 


Carbon Dioxide  22 mmol/L (21-32)   03/12/18  07:00    


 


Anion Gap  8  (8-16)   03/12/18  07:00    


 


BUN  40 mg/dL (7-18)  H D 03/12/18  07:00    


 


Creatinine  1.9 mg/dL (0.7-1.3)  H D 03/12/18  07:00    


 


Creat Clearance w eGFR  28.80  (>60)   03/09/18  15:47    


 


Random Glucose  94 mg/dL ()  D 03/12/18  07:00    


 


Calcium  8.0 mg/dL (8.5-10.1)  L  03/12/18  07:00    


 


Total Bilirubin  0.3 mg/dL (0.2-1.0)  D 03/12/18  07:00    


 


Direct Bilirubin  < 0.2 mg/dL (0.0-0.2)   03/12/18  07:00    


 


AST  16 U/L (15-37)  D 03/12/18  07:00    


 


ALT  7 U/L (12-78)  L  03/12/18  07:00    


 


Alkaline Phosphatase  108 U/L ()   03/12/18  07:00    


 


Total Protein  6.1 g/dl (6.4-8.2)  L  03/12/18  07:00    


 


Albumin  2.7 g/dl (3.4-5.0)  L D 03/12/18  07:00    


 


Urine Color  Yellow   03/09/18  22:50    


 


Urine Appearance  Turbid   03/09/18  22:50    


 


Urine pH  5.0  (5.0-8.0)   03/09/18  22:50    


 


Ur Specific Gravity  1.024  (1.001-1.035)   03/09/18  22:50    


 


Urine Protein  2+  (NEGATIVE)  H  03/09/18  22:50    


 


Urine Glucose (UA)  Negative  (NEGATIVE)   03/09/18  22:50    


 


Urine Ketones  Negative  (NEGATIVE)   03/09/18  22:50    


 


Urine Blood  Negative  (NEGATIVE)   03/09/18  22:50    


 


Urine Nitrite  Negative  (NEGATIVE)   03/09/18  22:50    


 


Urine Bilirubin  Negative  (NEGATIVE)   03/09/18  22:50    


 


Urine Urobilinogen  Negative mg/dL (0.2-1.0)   03/09/18  22:50    


 


Ur Leukocyte Esterase  Negative  (NEGATIVE)   03/09/18  22:50    


 


Urine WBC (Auto)  2 /hpf (3-5)   03/09/18  22:50    


 


Urine RBC (Auto)  <1 /hpf (0-3)   03/09/18  22:50    


 


Ur Epithelial Cells  Rare /HPF (FEW)   03/09/18  22:50    


 


Urine Bacteria  Rare /hpf (NONE SEEN)   03/09/18  22:50    


 


Urine Mucus  Rare   03/09/18  22:50    


 


RPR Titer  Nonreactive  (NONREACTIVE)   03/09/18  15:47    


 


HIV 1&2 Antibody Screen  Negative   03/09/18  15:00    


 


HIV P24 Antigen  Negative   03/09/18  15:00    








labs noted


Assessment: 





03/12/18 12:03


withdrawal sx


Plan: 





increase hydration


continue detox

## 2018-03-13 ENCOUNTER — HOSPITAL ENCOUNTER (INPATIENT)
Dept: HOSPITAL 74 - JER | Age: 48
LOS: 2 days | Discharge: LEFT BEFORE DISCHARGE | DRG: 137 | End: 2018-03-15
Attending: INTERNAL MEDICINE | Admitting: INTERNAL MEDICINE
Payer: COMMERCIAL

## 2018-03-13 VITALS — SYSTOLIC BLOOD PRESSURE: 150 MMHG | DIASTOLIC BLOOD PRESSURE: 77 MMHG | HEART RATE: 92 BPM | TEMPERATURE: 96.2 F

## 2018-03-13 VITALS — BODY MASS INDEX: 24.3 KG/M2

## 2018-03-13 DIAGNOSIS — F17.210: ICD-10-CM

## 2018-03-13 DIAGNOSIS — M06.9: ICD-10-CM

## 2018-03-13 DIAGNOSIS — J44.9: ICD-10-CM

## 2018-03-13 DIAGNOSIS — F10.230: ICD-10-CM

## 2018-03-13 DIAGNOSIS — N18.3: ICD-10-CM

## 2018-03-13 DIAGNOSIS — F32.9: ICD-10-CM

## 2018-03-13 DIAGNOSIS — D50.9: ICD-10-CM

## 2018-03-13 DIAGNOSIS — I25.10: ICD-10-CM

## 2018-03-13 DIAGNOSIS — I50.9: ICD-10-CM

## 2018-03-13 DIAGNOSIS — J18.9: ICD-10-CM

## 2018-03-13 DIAGNOSIS — N17.9: ICD-10-CM

## 2018-03-13 DIAGNOSIS — A15.9: Primary | ICD-10-CM

## 2018-03-13 DIAGNOSIS — D72.829: ICD-10-CM

## 2018-03-13 DIAGNOSIS — I13.0: ICD-10-CM

## 2018-03-13 DIAGNOSIS — I10: ICD-10-CM

## 2018-03-13 DIAGNOSIS — I71.2: ICD-10-CM

## 2018-03-13 DIAGNOSIS — F11.20: ICD-10-CM

## 2018-03-13 DIAGNOSIS — F14.20: ICD-10-CM

## 2018-03-13 LAB
ALBUMIN SERPL-MCNC: 2.5 G/DL (ref 3.4–5)
ALP SERPL-CCNC: 108 U/L (ref 45–117)
ALT SERPL-CCNC: 7 U/L (ref 12–78)
ANION GAP SERPL CALC-SCNC: 11 MMOL/L (ref 8–16)
APPEARANCE UR: CLEAR
AST SERPL-CCNC: 15 U/L (ref 15–37)
BASOPHILS # BLD: 0.6 % (ref 0–2)
BILIRUB SERPL-MCNC: 0.1 MG/DL (ref 0.2–1)
BILIRUB UR STRIP.AUTO-MCNC: NEGATIVE MG/DL
BUN SERPL-MCNC: 45 MG/DL (ref 7–18)
CALCIUM SERPL-MCNC: 8.5 MG/DL (ref 8.5–10.1)
CHLORIDE SERPL-SCNC: 108 MMOL/L (ref 98–107)
CO2 SERPL-SCNC: 25 MMOL/L (ref 21–32)
COLOR UR: YELLOW
CREAT SERPL-MCNC: 2.2 MG/DL (ref 0.7–1.3)
DEPRECATED RDW RBC AUTO: 22 % (ref 11.9–15.9)
EOSINOPHIL # BLD: 4.4 % (ref 0–4.5)
GLUCOSE SERPL-MCNC: 110 MG/DL (ref 74–106)
HCT VFR BLD CALC: 28.2 % (ref 35.4–49)
HGB BLD-MCNC: 8.7 GM/DL (ref 11.7–16.9)
HYALINE CASTS URNS QL MICRO: 2 /LPF
KETONES UR QL STRIP: NEGATIVE
LEUKOCYTE ESTERASE UR QL STRIP.AUTO: NEGATIVE
LYMPHOCYTES # BLD: 14 % (ref 8–40)
MCH RBC QN AUTO: 24.1 PG (ref 25.7–33.7)
MCHC RBC AUTO-ENTMCNC: 30.9 G/DL (ref 32–35.9)
MCV RBC: 77.9 FL (ref 80–96)
MONOCYTES # BLD AUTO: 12.4 % (ref 3.8–10.2)
MUCOUS THREADS URNS QL MICRO: (no result)
NEUTROPHILS # BLD: 68.6 % (ref 42.8–82.8)
NITRITE UR QL STRIP: NEGATIVE
PH UR: 5 [PH] (ref 5–8)
PLATELET # BLD AUTO: 522 K/MM3 (ref 134–434)
PMV BLD: 7.5 FL (ref 7.5–11.1)
POTASSIUM SERPLBLD-SCNC: 4.9 MMOL/L (ref 3.5–5.1)
PROT SERPL-MCNC: 6.2 G/DL (ref 6.4–8.2)
PROT UR QL STRIP: (no result)
PROT UR QL STRIP: NEGATIVE
RBC # BLD AUTO: 3.62 M/MM3 (ref 4–5.6)
RBC # UR STRIP: NEGATIVE /UL
SODIUM SERPL-SCNC: 144 MMOL/L (ref 136–145)
SP GR UR: 1.02 (ref 1–1.03)
UROBILINOGEN UR STRIP-MCNC: NEGATIVE MG/DL (ref 0.2–1)
WBC # BLD AUTO: 12.1 K/MM3 (ref 4–10)

## 2018-03-13 RX ADMIN — ASPIRIN 81 MG SCH MG: 81 TABLET ORAL at 10:32

## 2018-03-13 RX ADMIN — PANTOPRAZOLE SODIUM SCH MG: 40 TABLET, DELAYED RELEASE ORAL at 05:16

## 2018-03-13 RX ADMIN — CARVEDILOL SCH MG: 25 TABLET, FILM COATED ORAL at 10:32

## 2018-03-13 RX ADMIN — CLOTRIMAZOLE SCH: 1 CREAM TOPICAL at 23:18

## 2018-03-13 RX ADMIN — AMLODIPINE BESYLATE SCH MG: 10 TABLET ORAL at 10:32

## 2018-03-13 RX ADMIN — ASPIRIN 81 MG SCH MG: 81 TABLET ORAL at 22:35

## 2018-03-13 RX ADMIN — NICOTINE SCH: 21 PATCH TRANSDERMAL at 10:33

## 2018-03-13 RX ADMIN — AMLODIPINE BESYLATE SCH MG: 5 TABLET ORAL at 22:36

## 2018-03-13 RX ADMIN — Medication SCH TAB: at 10:32

## 2018-03-13 RX ADMIN — Medication SCH: at 23:18

## 2018-03-13 RX ADMIN — CARVEDILOL SCH: 25 TABLET, FILM COATED ORAL at 23:16

## 2018-03-13 RX ADMIN — CARVEDILOL SCH MG: 25 TABLET, FILM COATED ORAL at 22:36

## 2018-03-13 RX ADMIN — HEPARIN SODIUM SCH UNIT: 5000 INJECTION, SOLUTION INTRAVENOUS; SUBCUTANEOUS at 22:35

## 2018-03-13 RX ADMIN — ESCITALOPRAM SCH MG: 20 TABLET, FILM COATED ORAL at 22:36

## 2018-03-13 RX ADMIN — Medication SCH APPLIC: at 10:33

## 2018-03-13 RX ADMIN — CLOTRIMAZOLE SCH APPLIC: 1 CREAM TOPICAL at 10:33

## 2018-03-13 RX ADMIN — HEPARIN SODIUM SCH: 5000 INJECTION, SOLUTION INTRAVENOUS; SUBCUTANEOUS at 22:44

## 2018-03-13 RX ADMIN — Medication SCH: at 23:17

## 2018-03-13 RX ADMIN — TRAZODONE HYDROCHLORIDE SCH MG: 50 TABLET ORAL at 22:36

## 2018-03-13 NOTE — PDOC
Attending Attestation





- Resident


Resident Name: Tank Gamboason





- ED Attending Attestation


I have performed the following: I have examined & evaluated the patient, The 

case was reviewed & discussed with the resident, I agree w/resident's findings 

& plan, Exceptions are as noted





- HPI


HPI: 





03/13/18 17:16


48-year-old male was admitted to [facility] 2 days ago for heroin detox.  

Brought in by ambulance because he had a history of positive PPD and findings 

on the chest x-ray.  He is asymptomatic and does not have fever, chills, 

coronary to cough at this time and denies any night sweats or weight loss.


He states that he's had a positive PPD for many years and has already been 

treated with 9 months of [facility]


03/13/18 20:08








- Physicial Exam


PE: 





03/13/18 18:44


49 yo male in no acute distress sent for further evaluation of +ppd 


head ncat


eyes talat eomi


neck supple


lungs no wheezing,no crackles appreciated


cvs [id number]


abd nontender


ext no tenderness, no deformity


skin no cellulitis,warm and dry


neuro axox3, ambulatory


psych appropriate


03/13/18 19:54








- Medical Decision Making





03/13/18 19:55


CAT scan of his chest did show bilateral patchy infiltrates in both upper and 

lower lobes.


Case is discussed with infectious disease.  Dr. Banks the patient will be 

admitted and have serial repeat sputum cultures to rule out TB.


Patient currently is asymptomatic with no fever, chills or  cough





-he was given antibiotics initially for community-acquired pneumonia


-pt to be admitted


03/13/18 20:09

## 2018-03-13 NOTE — PN
Teaching Attending Note


Name of Resident: Lazaro Plaza





ATTENDING PHYSICIAN STATEMENT





I saw and evaluated the patient.


I reviewed the resident's note and discussed the case with the resident.


I agree with the resident's findings and plan as documented.








SUBJECTIVE:


48 M with hx. of AAA, aortic stent, CHF, Opiod dependence (on Methadone,) From 

[address] [place] [address]. In Detox for heroin/etoh dependence. States he recently had + PPD, 

and + CXR findings. No hemoptysis, weight loss, or diaphoresis. Notes he had a 

prior + TB hx. and was treated with INH for 9 month in 1996. NO fever or 

chills. 





OBJECTIVE:


Physical:


VS:


GEN:


HEENT:


CARD:


RESP:


ABD:


EXT:





 CBCD











WBC  12.1 K/mm3 (4.0-10.0)  H D 03/13/18  07:00    


 


RBC  3.62 M/mm3 (4.00-5.60)  L  03/13/18  07:00    


 


Hgb  8.7 GM/dL (11.7-16.9)  L D 03/13/18  07:00    


 


Hct  28.2 % (35.4-49)  L D 03/13/18  07:00    


 


MCV  77.9 fl (80-96)  L  03/13/18  07:00    


 


MCHC  30.9 g/dl (32.0-35.9)  L  03/13/18  07:00    


 


RDW  22.0 % (11.9-15.9)  H  03/13/18  07:00    


 


Plt Count  522 K/MM3 (134-434)  H  03/13/18  07:00    


 


MPV  7.5 fl (7.5-11.1)   03/13/18  07:00    








 CMP











Sodium  144 mmol/L (136-145)   03/13/18  07:00    


 


Potassium  4.9 mmol/L (3.5-5.1)   03/13/18  07:00    


 


Chloride  108 mmol/L ()  H  03/13/18  07:00    


 


Carbon Dioxide  25 mmol/L (21-32)   03/13/18  07:00    


 


Anion Gap  11  (8-16)   03/13/18  07:00    


 


BUN  45 mg/dL (7-18)  H  03/13/18  07:00    


 


Creatinine  2.2 mg/dL (0.7-1.3)  H  03/13/18  07:00    


 


Creat Clearance w eGFR  31.84  (>60)   03/13/18  07:00    


 


Random Glucose  110 mg/dL ()  H  03/13/18  07:00    


 


Calcium  8.5 mg/dL (8.5-10.1)   03/13/18  07:00    


 


Total Bilirubin  0.1 mg/dL (0.2-1.0)  L D 03/13/18  07:00    


 


AST  15 U/L (15-37)   03/13/18  07:00    


 


ALT  7 U/L (12-78)  L  03/13/18  07:00    


 


Alkaline Phosphatase  108 U/L ()   03/13/18  07:00    


 


Total Protein  6.2 g/dl (6.4-8.2)  L  03/13/18  07:00    


 


Albumin  2.5 g/dl (3.4-5.0)  L  03/13/18  07:00    

















ASSESSMENT AND PLAN:

## 2018-03-13 NOTE — HP
CHIEF COMPLAINT: r/o TB





PCP: Dr. Pina @ [facility] in the [place]





HISTORY OF PRESENT ILLNESS:





48 year old male with a past medical history of hypertension, abdominal aortic 

aneurysm s/p aortic stent, CHF, opioid addiction, TB exposure (1996) brought 

from [facility] for abnormal CXR and labwork. Patient denies any cough, 

shortness of breath, fevers, chills, nausea, vomiting, diarrhea, chest pain, 

abdominal pain. Patient does endorse night sweats over the past 3 nights as 

well as weight loss. He states that he was exposed to TB in the past and was 

treated with one medication, but he denies ever having overt tuberculosis. 

Patient states that he had an episode of pneumonia in the past. He was admitted 

for detox at [facility] on 3/9 for heroin and alcohol use. He completed 4 days 

at [facility] before arriving here. Denies having withdrawal symptoms. Denies 

ever having episode of seizure.





ER course was notable for:


(1) normal temperature


(2) white count (at [facility] 12.1)


(3) anemia (8.7)





Recent Travel: unknown





PAST MEDICAL HISTORY:





hypertension, abdominal aortic aneurysm s/p aortic stent, CHF, opioid addiction

, TB exposure (1996)





PAST SURGICAL HISTORY: 





6 prior surgeries, hip replacement, AAA, R hand surgery, 





Social History:


Smoking: 10 pack year hx 


Alcohol: chronic user, last use last Wednesday


Drugs: Heroin





Family History:


Allergies





fish derived Allergy (Severe, Verified 03/12/18 15:31)


 Hives


 SEAFOOD = FISH + SHELLFISH 


shellfish derived Allergy (Severe, Verified 03/12/18 15:31)


 Hives


 SEAFOOD = FISH + SHELLFISH 


tomato Allergy (Severe, Verified 03/09/18 14:12)


 Swelling


No Known Drug Allergies Allergy (Unknown, Verified 03/09/18 15:05)


 


SEAFOOD Allergy (Severe, Uncoded 03/09/18 14:12)


 Hives


NKDA Allergy (Uncoded 03/09/18 14:12)


 








HOME MEDICATIONS:


 Home Medications











 Medication  Instructions  Recorded


 


Escitalopram Oxalate [Lexapro -] 20 mg PO DAILY #30 tablet 03/10/18


 


traZODone HCL [Desyrel -] 50 mg PO HS #30 tablet 03/10/18


 


Amlodipine Besylate [Norvasc -] 10 mg PO DAILY #30 tablet 03/12/18


 


Aspirin [ASA -] 81 mg PO DAILY #30 tab.chew 03/12/18


 


Carvedilol [Coreg -] 25 mg PO BID #60 tablet 03/12/18








REVIEW OF SYSTEMS


CONSTITUTIONAL: night sweats, weight loss


Absent:  fever, chills, diaphoresis, generalized weakness, malaise, loss of 

appetite, weight change


HEENT: 


Absent:  rhinorrhea, nasal congestion, throat pain, throat swelling, difficulty 

swallowing, mouth swelling, ear pain, eye pain, visual changes


CARDIOVASCULAR: 


Absent: chest pain, syncope, palpitations, irregular heart rate, lightheadedness

, peripheral edema


RESPIRATORY: 


Absent: cough, shortness of breath, dyspnea with exertion, orthopnea, wheezing, 

stridor, hemoptysis


GASTROINTESTINAL:


Absent: abdominal pain, abdominal distension, nausea, vomiting, diarrhea, 

constipation, melena, hematochezia


GENITOURINARY: 


Absent: dysuria, frequency, urgency, hesitancy, hematuria, flank pain, genital 

pain


MUSCULOSKELETAL: 


Absent: myalgia, arthralgia, joint swelling, back pain, neck pain


SKIN: 


Absent: rash, itching, pallor


HEMATOLOGIC/IMMUNOLOGIC: 


Absent: easy bleeding, easy bruising, lymphadenopathy, frequent infections


ENDOCRINE:


Absent: unexplained weight gain, unexplained weight loss, heat intolerance, 

cold intolerance


NEUROLOGIC: 


Absent: headache, focal weakness or paresthesias, dizziness, unsteady gait, 

seizure, mental status changes, bladder or bowel incontinence


PSYCHIATRIC: 


Absent: anxiety, depression, suicidal or homicidal ideation, hallucinations.








PHYSICAL EXAMINATION


 Vital Signs - 24 hr











  03/13/18 03/13/18





  16:40 17:34


 


Temperature 98.9 F 


 


Pulse Rate 81 


 


Respiratory 17 





Rate  


 


Blood Pressure 85/55 


 


Blood Pressure  133/60





[Right]  


 


O2 Sat by Pulse 97 





Oximetry (%)  











GENERAL: A&O x 3


EYES: PERRLA, EOMI


ENT: moist mucus membranes


LUNGS: diminished breath sounds b/l


HEART: RRR no murmurs


ABDOMEN: soft, nontender, bs presents


MUSCULOSKELETAL: ROM normal


UPPER EXTREMITIES: 2+ pulses, warm, well-perfused. No cyanosis. No clubbing. No 

peripheral edema.


LOWER EXTREMITIES: 2+ pulses, warm, well-perfused. No calf tenderness. No 

peripheral edema. 


NEUROLOGICAL:  Cranial nerves II-XII intact. Normal speech. Normal gait.








IMAGING:


CT Chest (3/13): Nonspecific bilateral pulmonary infiltrates are noted as 

discussed above which may be on the basis of tuberculosis. Paraseptal 

emphysema. Probable superimposed centrilobular emphysema (versus representing 

subcentimeter postinflammatory/ postinfectious blebs). Mild nonspecific 

mediastinal lymphadenopathy. Atherosclerotic coronary artery calcifications 

which are at least moderate in degree. Endovascular aortic stent in place. 





ASSESSMENT/PLAN:





48 year old male with a hx of HTN, AAA, CHF, opioid abuse, TB exposure is 

admitted to the hospital to r/o TB/possible PNA





#Pneumonia: unlikely community acquired PNA given no symptoms, could be TB 

based on CXR/CT


-given ceftriaxone/azithromycin in ED


-hold abx for now given asymptomatic picture, re-evaluate in AM


-ID consult, Dr. Simmons appreciated


-CT results listed above, cannot r/o TB


-Sputum cultures


-AFB smears x3


-Quantiferon gold


-repeat EKG for prolonged QT


-airborne isolation precautions





#Detox: patient was at [facility] for heroin and alcohol detox, not currently 

having any withdrawal symptoms


-continue methadone dose given at [facility] (10mg @ 6am)


-patient was lethargic as per nurse; hold librium and give valium 2mg PRN


-miralax/colace if constipated due to methadone use


-Dr. Delaney consult appreciated for detox





#YONATHAN: patient may have YONATHAN on CKD, unclear


-urine lytes in AM





#Microcytic Anemia: stable


-iron studies in AM


-repeat CBC in AM





#Hypertension: stable, not an acute concern


-continue amlodipine 10mg PO QD


-continue carvedilol 25m PO BID





#S/P Aortic repair


-continue aspirin 81mg QD





#Depression: stable


-continue lexapro 20mg PO QD


-continue trazadone 50mg PO HS





#FEN


-No standing fluids


-Replete lytes in AM


-sodium-controlled diet





#Prophylaxis


-heparin 5000 subq TID





#Disposition


-admit to med-surg





Visit type





- Emergency Visit


Emergency Visit: Yes


ED Registration Date: 03/13/18


Care time: The patient presented to the Emergency Department on the above date 

and was hospitalized for further evaluation of their emergent condition.





- New Patient


This patient is new to me today: Yes


Date on this admission: 03/13/18





- Critical Care


Critical Care patient: No





Hospitalist Screening





- Colonoscopy Questionnaire


Colonoscopy Questionnaire: 





Colonoscopy Questionnaire








-   Patient:


50 - 75 years old and never had a screening colonoscopy: Unknown


History of colon or rectal polyps, or CA: Unknown


History of IBD, Crohn's disease or UC: Unknown


History of abdominal radiation therapy as a child: Unknown





-   Relative:


1 with colon or rectal CA, or polyps at age 60 or younger: Unknown


Colon or rectal CA diagnosed at age 45 or younger: Unknown


Multiple relatives with colon or rectal CA: Unknown





-   Outcome:


Screening Result: Negative Screen

## 2018-03-13 NOTE — PDOC
History of Present Illness





- General


Stated Complaint: ABNORMAL X RAY


Time Seen by Provider: 03/13/18 16:40





- History of Present Illness


Initial Comments: 





03/13/18 16:47


49 yo M with h/o HTN, AAA, aortic stent placement, CHF, opioid dependence ( on 

methadone), in detox ( [address] [place] [address]) 2/2 heroin and alcohol dependence who 

presents with + PPD and CXR findings. Pt. arrives from [facility] after 

admission ( 03/09/18). Denies symtpoms. Denes F/C, CP, SOB, cough, hemoptysis, 

night sweats, wt. loss. CXR (03/13/18) reveals patchy opacification within R 

upper and L lower lobe suggesting possible acute vs. chronic changes. Last used 

Heroin ( 03/5/18). Tobacco use 1/2 ppd for 20-25 years. H/o TB treated with INH 

x 9 months in 1996. 


Denies F/C, N/V, CP, SOB, abdominal pain, urinary complaints, diarrhea, 

constipation, lightheadedness, weakness, sensory changes. 








Past History





- Past Medical History


Allergies/Adverse Reactions: 


 Allergies











Allergy/AdvReac Type Severity Reaction Status Date / Time


 


fish derived Allergy Severe Hives Verified 03/12/18 15:31


 


shellfish derived Allergy Severe Hives Verified 03/12/18 15:31


 


tomato Allergy Severe Swelling Verified 03/09/18 14:12


 


No Known Drug Allergies Allergy Unknown  Verified 03/09/18 15:05


 


SEAFOOD Allergy Severe Hives Uncoded 03/09/18 14:12


 


NKDA Allergy   Uncoded 03/09/18 14:12











Home Medications: 


Ambulatory Orders





Escitalopram Oxalate [Lexapro -] 20 mg PO DAILY #30 tablet 03/10/18 


traZODone HCL [Desyrel -] 50 mg PO HS #30 tablet 03/10/18 


Amlodipine Besylate [Norvasc -] 10 mg PO DAILY #30 tablet 03/12/18 


Aspirin [ASA -] 81 mg PO DAILY #30 tab.chew 03/12/18 


Carvedilol [Coreg -] 25 mg PO BID #60 tablet 03/12/18 








Anemia: Yes (was on feosol)


Asthma: No


Cancer: No


Cardiac Disorders: Yes (stent in the descending aorta because of aneurysm)


CVA: No


COPD: No


CHF: Yes


Dementia: No


Diabetes: No


GI Disorders: Yes (stomach ulcer/acid reflux)


 Disorders: No


HTN: Yes


Hypercholesterolemia: Yes (no meds)


Kidney Stones: No


Liver Disease: No


Seizures: No


Thyroid Disease: No





- Surgical History


Abdominal Surgery: No


Appendectomy: No


Cardiac Surgery: Yes (Stent in descending aorta)


Cholecystectomy: No


Lung Surgery: No


Neurologic Surgery: No


Orthopedic Surgery: Yes (left hip replacement, right knee/leg, right/arm/right 

small toe x2)





- Reproductive History


Testicular Surgery: No





- Suicide/Smoking/Psychosocial Hx


Smoking History: Current every day smoker


Have you smoked in the past 12 months: Yes


Number of Cigarettes Smoked Daily: 10


Cigars Per Day: 0


'Breaking Loose' booklet given: 03/09/18


Hx Alcohol Use: Yes


Drug/Substance Use Hx: Yes


Substance Use Type: Alcohol, Cocaine, Opiates


Hx Substance Use Treatment: Yes





**Review of Systems





- Review of Systems


Comments:: 





03/13/18 16:46


GENERAL/CONSTITUTIONAL: No fever or chills. No weakness.


HEAD, EYES, EARS, NOSE AND THROAT: No change in vision. No ear pain or 

discharge. No sore throat.-


CARDIOVASCULAR: No chest pain or shortness of breath


RESPIRATORY: No cough, wheezing, or hemoptysis.


GASTROINTESTINAL: No nausea, vomiting, diarrhea or constipation.


GENITOURINARY: No dysuria, frequency, or change in urination.


MUSCULOSKELETAL: No joint or muscle swelling or pain. No neck or back pain.


SKIN: No rash


NEUROLOGIC: No headache, vertigo, loss of consciousness, or change in strength/

sensation.


ENDOCRINE: No increased thirst. No abnormal weight change


HEMATOLOGIC/LYMPHATIC: No anemia, easy bleeding, or history of blood clots.


ALLERGIC/IMMUNOLOGIC: No hives or skin allergy.








*Physical Exam





- Physical Exam


Comments: 


GENERAL: Awake, alert, and fully oriented, in no acute distress


HEAD: No signs of trauma, normocephalic, atraumatic 


EYES: PERRLA, EOMI, sclera anicteric, conjunctiva clear


ENT: Hearing grossly normal, nares patent, oropharynx clear without


exudates. Moist mucosa


NECK: Normal ROM, supple, no lymphadenopathy, JVD, or masses


LUNGS: No distress, speaks full sentences, clear to auscultation bilaterally 


HEART: Regular rate and rhythm, normal S1 and S2, no murmurs, rubs or gallops, 

peripheral pulses normal and equal bilaterally. 


EXTREMITIES : Normal inspection, Normal range of motion, no edema.  No clubbing 

or cyanosis. 


NEUROLOGICAL: Cranial nerves II through XII grossly intact.  Normal speech, 

normal gait, no focal sensorimotor deficits 


SKIN: Excorations on extremities. Warm, Dry, normal turgor, no rashes or 

lesions noted











Medical Decision Making





- Medical Decision Making





49 yo M with h/o HTN, AAA, aortic stent placement, CHF, opioid dependence ( on 

methadone), in detox ( 2 [facility]) 2/2 heroin and alcohol dependence who 

arrives from [address] [place] [address] after admission ( 03/09/18) with recent +PPD and CXR (

03/13/18) reveals patchy opacification within R upper and L lower lobe 

suggesting possible acute vs. chronic changes. No prior CXR to compare. Denies 

symptoms. Denies F/C, CP, SOB, cough, hemoptysis, night sweats, wt. loss. Last 

used Heroin ( 03/5/18). Tobacco use 1/2 ppd for 20-25 years. H/o TB treated 

with INH x 9 months in 1996. Denies F/C, N/V, CP, SOB, abdominal pain, urinary 

complaints, diarrhea, constipation, lightheadedness, weakness, sensory changes. 

HDS. Physical exam is unremarkable and hemdoynamically stable. Will evaluate 

for possible active vs. latent TB to medically clear patient. Will also 

evaluate for PNA. 





ED Course: 


03/13/18 17:12


Dr. Banks answering service 


03/13/18 17:13





Per Dr. Banks will obtain CT CHEST for further TB evaluation. 





03/13/18 18:58


CT Chest: Nonspecific pulmonary infiltrates which be on the basis of TB. 





WBC: 12.1 ( 5.1 on 3/09) 


H/H: 8.7 /28.2 ( 11/33.6 on (03/09/18) ) 





Per Dr. Banks pt. may  have possible CAP. vs. possible aspiration vs. TB. 

Recommends getting sputum and PCR for TB. Recommends admission. 





Contact precautions. 





03/13/18 20:08


Methadone 10 mg, Escitalapram 20 mg, Librium 10 mg. 


03/13/18 20:09





Patient stable and informed of need for admission and tx. 





Admit to hospitalist med/surg Ton.








*DC/Admit/Observation/Transfer


Diagnosis at time of Disposition: 


Pneumonia


Qualifiers:


 Pneumonia type: due to unspecified organism Laterality: right Lung location: 

upper lobe of lung Qualified Code(s): J18.1 - Lobar pneumonia, unspecified 

organism








- Discharge Dispostion


Condition at time of disposition: Stable


Admit: Yes





- Referrals





- Patient Instructions


Additional Instructions: 


Please return to the emergency department with any new or worsening symptoms or 

concerns. Please follow up with your primary care physician within 72 hours. 








- Post Discharge Activity





- Attestations


Physician Attestion: 





03/13/18 16:47


I attest to the information provided in this note.

## 2018-03-13 NOTE — PN
Teaching Attending Note


Name of Resident: Lazaro Plaza





ATTENDING PHYSICIAN STATEMENT





I saw and evaluated the patient.


I reviewed the resident's note and discussed the case with the resident.


I agree with the resident's findings and plan as documented.








SUBJECTIVE:


48 M with hx. of AAA, depression, aortic stent, L. Hip replacement, current 

smoker,  CHF, Opiod dependence (on Methadone,) From [address] [place] [address]. In Detox for 

heroin/etoh dependence. States he recently had + PPD, and + CXR findings. No 

hemoptysis. Notes he had a prior + PPD hx. and was treated with INH for 9 month 

in 1996. NO fever or chills. States he has had weight loss unknown duration of 

time of about 30lbs. Also, notes diaphoresis for past 3 nights. No chest pain 

or pressure. No cough. No recent incarcarceration.





OBJECTIVE:


Physical:


VS:


 Vital Signs











 Period  Temp  Pulse  Resp  BP Sys/Ang  Pulse Ox


 


 Last 24 Hr  98.9 F  81  17  /55-60  97











GEN: NAD, resting in bed, AA0X3


HEENT: NCAT, PERRL, Throat without erythema or exudates


CARD: RRR S1, S2


RESP: CTAB


ABD: BSx4, NTD to palpation


EXT: - C/C/E





 CBCD











WBC  12.1 K/mm3 (4.0-10.0)  H D 03/13/18  07:00    


 


RBC  3.62 M/mm3 (4.00-5.60)  L  03/13/18  07:00    


 


Hgb  8.7 GM/dL (11.7-16.9)  L D 03/13/18  07:00    


 


Hct  28.2 % (35.4-49)  L D 03/13/18  07:00    


 


MCV  77.9 fl (80-96)  L  03/13/18  07:00    


 


MCHC  30.9 g/dl (32.0-35.9)  L  03/13/18  07:00    


 


RDW  22.0 % (11.9-15.9)  H  03/13/18  07:00    


 


Plt Count  522 K/MM3 (134-434)  H  03/13/18  07:00    


 


MPV  7.5 fl (7.5-11.1)   03/13/18  07:00    








 CMP











Sodium  144 mmol/L (136-145)   03/13/18  07:00    


 


Potassium  4.9 mmol/L (3.5-5.1)   03/13/18  07:00    


 


Chloride  108 mmol/L ()  H  03/13/18  07:00    


 


Carbon Dioxide  25 mmol/L (21-32)   03/13/18  07:00    


 


Anion Gap  11  (8-16)   03/13/18  07:00    


 


BUN  45 mg/dL (7-18)  H  03/13/18  07:00    


 


Creatinine  2.2 mg/dL (0.7-1.3)  H  03/13/18  07:00    


 


Creat Clearance w eGFR  31.84  (>60)   03/13/18  07:00    


 


Random Glucose  110 mg/dL ()  H  03/13/18  07:00    


 


Calcium  8.5 mg/dL (8.5-10.1)   03/13/18  07:00    


 


Total Bilirubin  0.1 mg/dL (0.2-1.0)  L D 03/13/18  07:00    


 


AST  15 U/L (15-37)   03/13/18  07:00    


 


ALT  7 U/L (12-78)  L  03/13/18  07:00    


 


Alkaline Phosphatase  108 U/L ()   03/13/18  07:00    


 


Total Protein  6.2 g/dl (6.4-8.2)  L  03/13/18  07:00    


 


Albumin  2.5 g/dl (3.4-5.0)  L  03/13/18  07:00    





CT CHEST: Non-specific bilateral pulmonary infiltertes which may be on the 

basis of TB, paraseptal emphysema, probable supraimposed centrilobar emphysema (

vs. sub cm, post-infectious, post-inflammatory blebs) mild non-specific 

mediastinal lymphadenopathy, athrosclerotic coronary artery calcification which 

are atleast moderate in degree, Endovascular aortic stent in place.


EKG: PENDING


 Home Medications











 Medication  Instructions  Recorded


 


Escitalopram Oxalate [Lexapro -] 20 mg PO DAILY #30 tablet 03/10/18


 


traZODone HCL [Desyrel -] 50 mg PO HS #30 tablet 03/10/18


 


Amlodipine Besylate [Norvasc -] 10 mg PO DAILY #30 tablet 03/12/18


 


Aspirin [ASA -] 81 mg PO DAILY #30 tab.chew 03/12/18


 


Carvedilol [Coreg -] 25 mg PO BID #60 tablet 03/12/18














A/P.)  48 M with hx. of AAA,  aortic stent, current smoker, depression, L. Hip 

replacement, CHF, Opiod dependence (on Methadone,) From [address] [place] [address]. In Detox 

for heroin/etoh dependence. States he recently had + PPD, and + CXR findings, 

being admitted for rule out TB





1.) Rule out TB


-AFB Smear X3


- Quant Gold


- Sputum cx


- ID consult


- Isolation Percautions





2.) Non-Specific Pulm. Infilterates


- ?CAP


- S/P Cef/Azithro in ED


- Pt. Asymptomatic


- Urine AGs


- Mild Leukocytosis, afebrile, would hold off further Abx. for now





3.) Opiod/ETOH Dependence


- Methadone protocol


- Detox Consult


- CIWA


- Thiamine/Folic A daily





4.) CHF hx


- C/W ASA, Coreg


- Euvolemic


- Ace/ARB when renal fxn improves





5.) TObacco Dependence


- Advise smoking cessation





6.) Depression


- C/W Lexapro





7.) Microcytic Anemia


- Fe Studies


- Outpt. Celina 


- Stool Occult





8.) YONATHAN?CKD


- U lytes


- Avoid Nephrotoxins


- Trend





9.) Dvt Ppx


- Heparin 5000 q8








Place in Med-Sx

## 2018-03-13 NOTE — PN
Psychiatric Progress Note


Vital Signs: 


 Vital Signs











 Period  Temp  Pulse  Resp  BP Sys/Ang  Pulse Ox


 


 Last 24 Hr  96.2 F-98.2 F  84-92  18-20  105-150/63-77  











Date of Session: 03/13/18


Chief Complaint:: " I feel  much better today." Follow-up visit.


HPI: Follow-up visit for this patient who presented with marked sedation over 

past 36 hours.Mr Sarmiento is currently undergoing detoxification treatment for 

alcohol,cocaine and opiate dependence.


ROS: Improved : patient is ambulatory with cane,steady and 

independent.Cognition has remarkably ameliorated.Alert and fully 

oriented.Visible in corridors.Sociable.Somatic complaint : joint pain.


Current Medications: 


Active Medications











Generic Name Dose Route Start Last Admin





  Trade Name Freq  PRN Reason Stop Dose Admin


 


Acetaminophen  650 mg  03/09/18 14:19  03/11/18 23:33





  Tylenol -  PO   650 mg





  Q4H PRN   Administration





  FEVER   


 


Amlodipine Besylate  10 mg  03/10/18 10:00  03/13/18 10:32





  Norvasc -  PO   10 mg





  DAILY AIDA   Administration


 


Aspirin  81 mg  03/10/18 10:00  03/13/18 10:32





  Asa -  PO   81 mg





  DAILY AIDA   Administration


 


Carvedilol  25 mg  03/09/18 22:00  03/13/18 10:32





  Coreg -  PO   25 mg





  BID AIDA   Administration


 


Clotrimazole  1 applic  03/10/18 22:00  03/13/18 10:33





  Lotrimin 1% Cream -  TP   1 applic





  BID AIDA   Administration


 


Colloidal Oatmeal  1 applic  03/10/18 10:23  





  Aveeno Soap -  TP   





  DAILY PRN   





  HYGEINE   


 


Eucalyptus/Menthol/Phenol/Sorbitol  1 each  03/09/18 14:19  





  Cepastat Lozenge -  MM   





  Q4H PRN   





  SORE THROAT   


 


Guaifenesin  10 ml  03/09/18 14:19  





  Robitussin Dm -  PO   





  Q6H PRN   





  COUGH   


 


Lactic Acid  1 applic  03/10/18 22:00  03/13/18 10:33





  Lac-Hydrin 12  TP   1 applic





  BID AIDA   Administration


 


Loperamide HCl  4 mg  03/09/18 14:19  





  Imodium -  PO   





  Q6H PRN   





  DIARRHEA   


 


Methadone HCl  5 mg  03/14/18 06:00  





  Dolophine -  PO  03/14/18 06:01  





  DAILY@0600 AIDA   


 


Nicotine  21 mg  03/10/18 10:00  03/13/18 10:33





  Nicoderm Patch -  TD   Not Given





  DAILY AIDA   


 


Nicotine Polacrilex  4 mg  03/09/18 14:19  





  Nicorette Gum -  BUC   





  Q2H PRN   





  NICOTINE REPLACEMENT RX   


 


Pantoprazole Sodium  40 mg  03/11/18 06:00  03/13/18 05:16





  Protonix -  PO   40 mg





  DAILY@0600 AIDA   Administration


 


Prenatal Multivit/Folic Acid/Iron  1 tab  03/10/18 10:00  03/13/18 10:32





  Prenatal Vitamins (Sjr) -  PO   1 tab





  DAILY AIDA   Administration


 


Thiamine HCl  100 mg  03/09/18 22:00  03/12/18 22:46





  Vitamin B1 -  PO   100 mg





  HS AIDA   Administration











Medication(s) Change(s): Gabapentin,lexapro,trazodone have been discontinued (

temporarily).Detox protocol (librium + methadone) has been cautiously modified.


Current Side Effect: Yes (sedation,observed on 3/12/18,was likely medication-

induced)


Lab tests ordered: Yes (comprehensive metabolic panel / CBC : repeat)


Lab tests reviewed: Yes (Anemia and abnormal renal function.Noted)


Provider note:: Nursing notes : reviewed and appreciated.Case discussed with NP 

Huy and counselor Alejandro.Chart reviewed.Labs discussed.Met with the 

patient at bedside.Found to be neatly groomed,jovial,pleasant on approach and 

visibly motivated for treatment." I would like to go to rehab after my detox, 

preferably here at [facility]." Mr Sarmiento is cognitively sound,well controlled,at 

ease with his surroundings,mobile (clearly focused on maintenance of his 

independence) and goal-directed.Took a shower this morning (without staff's 

assistance),ate breakfast,walks independently to nurses's station to get his 

medications (with cane) and went about his daily routine (strolls in the 

hallways with / without cane, socialization, attendance to social gatherings 

and regular presence at meals in the dining area with others).Performance 

observed by this writer (today).Patient exhibits good communication 

skills.Maintains adequate eye contact and appropriate body movements.Sensorium 

remains clear.Mr Sarmiento shows NO evidence of a cognitive impairment at time of 

this examination.Clinically euthymic and functioning at his baseline.Ready for 

his transition to rehabilitation treatment.Arrangements in progress.Medical 

status is currently monitored by BRYSON Son (in contact with the medical 

attendig,Dr Delaney).MSE completed.Patient is not a danger to self or 

others.Doing well WITHOUT psychotropic medications (gabapentin,lexapro,trazodone

,ambien).These medications will be restarted (at much lower doses on the 

rehabilitation unit).Psychiatry will follow.


Total face to face time:: 35





Mental Status Exam





- Mental Status Exam


Alert and Oriented to: Time, Place, Person


Cognitive Function: Good


Patient Appearance: Well Groomed


Mood: Hopeful, Euthymic


Affect: Appropriate, Normal Range


Patient Behavior: Talkative, Appropriate, Cooperative


Speech Pattern: Clear, Appropriate


Voice Loudness: Normal


Thought Process: Goal Oriented


Thought Disorder: Not Present


Hallucinations: Denies


Suicidal Ideation: Denies


Homicidal Ideation: Denies


Insight/Judgement: Fair (wants to stay at [facility] for rehab)


Sleep: Fair


Appetite: Good (empty foodtray seen at bedside)


Gait/Station: Other (patient moves around independently with cane)





Psychiatric Treatment Plan





- Problem List


(1) Alcohol dependence with uncomplicated withdrawal


Comment: .   





(2) Opioid dependence


Qualifiers: 


   Substance use status: uncomplicated   Qualified Code(s): F11.20 - Opioid 

dependence, uncomplicated   


Comment: .   





(3) Cocaine dependence


Comment: .   





(4) Nicotine dependence


Qualifiers: 


   Nicotine product type: cigarettes   Substance use status: uncomplicated   

Qualified Code(s): F17.210 - Nicotine dependence, cigarettes, uncomplicated   


Comment: .   





(5) Drug-induced mood disorder


Comment: .   





(6) Depressive disorder


Comment: .As per self-report and existing records.On medications.OPD care at 

[facility].   





(7) Insomnia


Comment: .

## 2018-03-13 NOTE — PN
[facility] Progress Note (SOAP)


Subjective: 





Interrupted Sleep, Tremors, Stomach Cramping, Diarrhea, Body Aches.


Objective: 


PATIENT A & O X 3, OBSERVED AMBULATING ON UNIT WITH ASSISTANCE OF A CANE. NO 

ACUTE DISTRESS.





03/13/18 13:35


 Vital Signs











Temperature  96.2 F L  03/13/18 09:58


 


Pulse Rate  92 H  03/13/18 09:58


 


Respiratory Rate  18   03/13/18 09:58


 


Blood Pressure  150/77   03/13/18 09:58


 


O2 Sat by Pulse Oximetry (%)      








 Laboratory Tests











  03/09/18 03/09/18 03/09/18





  15:00 15:47 15:47


 


WBC   5.1 


 


RBC   4.45 


 


Hgb   11.0 L 


 


Hct   33.6 L 


 


MCV   75.4 L 


 


MCH   24.7 L 


 


MCHC   32.7 


 


RDW   20.1 H 


 


Plt Count   593 H 


 


MPV   7.5 


 


Neutrophils %   


 


Lymphocytes %   


 


Monocytes %   


 


Eosinophils %   


 


Basophils %   


 


Sodium   


 


Potassium   


 


Chloride   


 


Carbon Dioxide   


 


Anion Gap   


 


BUN   


 


Creatinine   


 


Creat Clearance w eGFR   


 


Random Glucose   


 


Calcium   


 


Total Bilirubin   


 


Direct Bilirubin   


 


AST   


 


ALT   


 


Alkaline Phosphatase   


 


Total Protein   


 


Albumin   


 


Urine Color   


 


Urine Appearance   


 


Urine pH   


 


Ur Specific Gravity   


 


Urine Protein   


 


Urine Glucose (UA)   


 


Urine Ketones   


 


Urine Blood   


 


Urine Nitrite   


 


Urine Bilirubin   


 


Urine Urobilinogen   


 


Ur Leukocyte Esterase   


 


Urine WBC (Auto)   


 


Urine RBC (Auto)   


 


Ur Epithelial Cells   


 


Urine Bacteria   


 


Hyaline Casts   


 


Urine Mucus   


 


RPR Titer    Nonreactive


 


HIV 1&2 Antibody Screen  Negative  


 


HIV P24 Antigen  Negative  














  03/09/18 03/09/18 03/12/18





  15:47 22:50 07:00


 


WBC   


 


RBC   


 


Hgb   


 


Hct   


 


MCV   


 


MCH   


 


MCHC   


 


RDW   


 


Plt Count   


 


MPV   


 


Neutrophils %   


 


Lymphocytes %   


 


Monocytes %   


 


Eosinophils %   


 


Basophils %   


 


Sodium  137   138


 


Potassium  4.1   4.8


 


Chloride  102   108 H


 


Carbon Dioxide  25   22


 


Anion Gap  10   8


 


BUN  27 H D   40 H D


 


Creatinine  2.4 H D   1.9 H D


 


Creat Clearance w eGFR  28.80  


 


Random Glucose  72 L D   94  D


 


Calcium  8.8   8.0 L


 


Total Bilirubin  0.4  D   0.3  D


 


Direct Bilirubin    < 0.2


 


AST  13 L D   16  D


 


ALT  7 L D   7 L


 


Alkaline Phosphatase  132 H   108


 


Total Protein  7.6   6.1 L


 


Albumin  3.7   2.7 L D


 


Urine Color   Yellow 


 


Urine Appearance   Turbid 


 


Urine pH   5.0 


 


Ur Specific Gravity   1.024 


 


Urine Protein   2+ H 


 


Urine Glucose (UA)   Negative 


 


Urine Ketones   Negative 


 


Urine Blood   Negative 


 


Urine Nitrite   Negative 


 


Urine Bilirubin   Negative 


 


Urine Urobilinogen   Negative 


 


Ur Leukocyte Esterase   Negative 


 


Urine WBC (Auto)   2 


 


Urine RBC (Auto)   <1 


 


Ur Epithelial Cells   Rare 


 


Urine Bacteria   Rare 


 


Hyaline Casts   


 


Urine Mucus   Rare 


 


RPR Titer   


 


HIV 1&2 Antibody Screen   


 


HIV P24 Antigen   














  03/13/18 03/13/18 03/13/18





  07:00 07:00 08:15


 


WBC  12.1 H D  


 


RBC  3.62 L  


 


Hgb  8.7 L D  


 


Hct  28.2 L D  


 


MCV  77.9 L  


 


MCH  24.1 L  


 


MCHC  30.9 L  


 


RDW  22.0 H  


 


Plt Count  522 H  


 


MPV  7.5  


 


Neutrophils %  68.6  


 


Lymphocytes %  14.0  


 


Monocytes %  12.4 H  


 


Eosinophils %  4.4  


 


Basophils %  0.6  


 


Sodium   144 


 


Potassium   4.9 


 


Chloride   108 H 


 


Carbon Dioxide   25 


 


Anion Gap   11 


 


BUN   45 H 


 


Creatinine   2.2 H 


 


Creat Clearance w eGFR   31.84 


 


Random Glucose   110 H 


 


Calcium   8.5 


 


Total Bilirubin   0.1 L D 


 


Direct Bilirubin   


 


AST   15 


 


ALT   7 L 


 


Alkaline Phosphatase   108 


 


Total Protein   6.2 L 


 


Albumin   2.5 L 


 


Urine Color    Yellow


 


Urine Appearance    Clear


 


Urine pH    5.0


 


Ur Specific Gravity    1.017


 


Urine Protein    2+ H


 


Urine Glucose (UA)    Negative


 


Urine Ketones    Negative


 


Urine Blood    Negative


 


Urine Nitrite    Negative


 


Urine Bilirubin    Negative


 


Urine Urobilinogen    Negative


 


Ur Leukocyte Esterase    Negative


 


Urine WBC (Auto)    1


 


Urine RBC (Auto)    <1


 


Ur Epithelial Cells   


 


Urine Bacteria   


 


Hyaline Casts    2


 


Urine Mucus    Rare


 


RPR Titer   


 


HIV 1&2 Antibody Screen   


 


HIV P24 Antigen   








LABS NOTED.


Assessment: 





03/13/18 13:36


WITHDRAWAL SYMPTOMS.


Plan: 





CONTINUE DETOX.

## 2018-03-13 NOTE — MSN
Admitting History and Physical





- Primary Care Physician


PCP: Dr Pina ([facility])





- Admission


Chief Complaint: Pneumonia


History of Present Illness: 





Patient


History Source: Patient, Transfer Record


Limitations to Obtaining History: No Limitations





- Past Medical History


Cardiovascular: Yes: Aneurysm (Aortic aneurysm), CHF


Pulmonary: Yes: Pneumonia, Other (Prior exposure to TB)





- Past Surgical History


Past Surgical History: Yes: AAA Repair (Endovascular aortic stent), Joint 

Replacement (Total hip replacement)





- Smoking History


Smoking history: Current every day smoker (Patient endorses smoking 1/2 pack 

per day)


Have you smoked in the past 12 months: Yes


Aproximately how many cigarettes per day: 10





- Alcohol/Substance Use


Hx Alcohol Use: Yes


History of Substance Use: reports: Heroin





Home Medications





- Allergies


Allergies/Adverse Reactions: 


 Allergies











Allergy/AdvReac Type Severity Reaction Status Date / Time


 


fish derived Allergy Severe Hives Verified 03/12/18 15:31


 


shellfish derived Allergy Severe Hives Verified 03/12/18 15:31


 


tomato Allergy Severe Swelling Verified 03/09/18 14:12


 


No Known Drug Allergies Allergy Unknown  Verified 03/09/18 15:05


 


SEAFOOD Allergy Severe Hives Uncoded 03/09/18 14:12


 


NKDA Allergy   Uncoded 03/09/18 14:12














- Home Medications


Home Medications: 


Ambulatory Orders





Escitalopram Oxalate [Lexapro -] 20 mg PO DAILY #30 tablet 03/10/18 


traZODone HCL [Desyrel -] 50 mg PO HS #30 tablet 03/10/18 


Amlodipine Besylate [Norvasc -] 10 mg PO DAILY #30 tablet 03/12/18 


Aspirin [ASA -] 81 mg PO DAILY #30 tab.chew 03/12/18 


Carvedilol [Coreg -] 25 mg PO BID #60 tablet 03/12/18 











Family Disease History





- Family Disease History


Family Disease History: Diabetes: Father (HTN), Heart Disease: Father





Review of Systems





- Review of Systems


Constitutional: reports: Night Sweats, Unintentional Wgt. Loss (Patient states 

that he weighed 160lbs before his last relapse).  denies: Fever


Cardiovascular: denies: Edema, Shortness of Breath


Respiratory: denies: Cough, SOB, SOB on Exertion


Neurological: denies: Seizure, Tremors





Physical Examination


Vital Signs: 


 Vital Signs











Temperature  98.9 F   03/13/18 16:40


 


Pulse Rate  81   03/13/18 16:40


 


Respiratory Rate  17   03/13/18 16:40


 


Blood Pressure  133/60   03/13/18 17:34


 


O2 Sat by Pulse Oximetry (%)  97   03/13/18 16:40











Constitutional: Yes: Well Nourished, No Distress


Neck: Yes: WNL


Cardiovascular: Yes: Regular Rate and Rhythm, S1, S2


Respiratory: Yes: Diminished (Bilaterally)


Edema: No


Peripheral Pulses WNL: Yes


Neurological: Yes: WNL, Alert, Oriented


...Motor Strength: WNL


Psychiatric: Yes: WNL, Alert, Oriented





Problem List





- Problems


(1) Alcohol dependence with uncomplicated withdrawal


Code(s): F10.230 - ALCOHOL DEPENDENCE WITH WITHDRAWAL, UNCOMPLICATED   





(2) Opioid dependence


Code(s): F11.20 - OPIOID DEPENDENCE, UNCOMPLICATED   


Qualifiers: 


   Substance use status: uncomplicated   Qualified Code(s): F11.20 - Opioid 

dependence, uncomplicated   





(3) CHF (congestive heart failure)


Code(s): I50.9 - HEART FAILURE, UNSPECIFIED   





(4) Depressive disorder


Code(s): F32.9 - MAJOR DEPRESSIVE DISORDER, SINGLE EPISODE, UNSPECIFIED   





(5) HTN (hypertension)


Code(s): I10 - ESSENTIAL (PRIMARY) HYPERTENSION   


Qualifiers: 


   Hypertension type: essential hypertension   Qualified Code(s): I10 - 

Essential (primary) hypertension   





(6) Ascending aortic aneurysm


Code(s): I71.2 - THORACIC AORTIC ANEURYSM, WITHOUT RUPTURE   





(7) Status post left hip replacement


Code(s): Z96.642 - PRESENCE OF LEFT ARTIFICIAL HIP JOINT   





Assessment/Plan





A/P:





Heroine withdrawal


- Methadone taper (currently on 10mg PO QD)


- Miralax and Colace PRN for constipation





Alcohol withdrawal


- Valium 2mg PO Q6H PRN


- Administer thiamine and folic acid PO





Pneumonia r/o TB


- AFB Smear


- Quantiferon GOLD


- Sputum culture


- ID consult


- Ceftriaxone and azithromycin pending EKG (r/o QT prolongation)





Depression


- Continue Lexapro from home meds





Microcytic Anemia


- Iron studies in AM





Chronic CHF


- Continue ASA, Carvedilol from home meds





YONATHAN


- Urine electrolytes ordered in AM


- Encourage PO hydration


- Renal US





HTN


- Continue Norvasc from home meds





DVT Prophylaxis


- Heparin 5000 Units SQ





Dispo:


- Admit to Med/Surg

## 2018-03-13 NOTE — PN
[facility] Progress Note


Note: 





Results of CXR (for history of Positive PPD) noted. Results inconclusive for 

whether or not acute pathology is present. Results of Repeat CBC and repeat CMP 

noted. Leukocytosis, worsening Anemia noted, and abnormal renal lab values 

noted. Patient denies SOB, dizziness, and chest pain. Patient denies any known 

history of Respiratory Disease, Immune Disease, Hematologic Disease, or 

Bleeding disorder. Lungs sounds auscultated clear and equal bilaterally. 

Patient A & O X 3, observed ambulating on unit with assistance of a cane, in No 

acute distress. Patient intent on going to [facility] tomorrow 

after discharge from Detox. Report given to Dr. Jeffers at [facility]. Patient to be taken to [facility] for further 

medical evaluation and for medical clearance prior to consideration for 

admission to Rehab after discharge from Detox. MIGUEL Blevins NP

## 2018-03-14 LAB
ANION GAP SERPL CALC-SCNC: 12 MMOL/L (ref 8–16)
BASOPHILS # BLD: 0.7 % (ref 0–2)
BUN SERPL-MCNC: 35 MG/DL (ref 7–18)
CALCIUM SERPL-MCNC: 8.5 MG/DL (ref 8.5–10.1)
CHLORIDE SERPL-SCNC: 102 MMOL/L (ref 98–107)
CO2 SERPL-SCNC: 23 MMOL/L (ref 21–32)
CREAT SERPL-MCNC: 1.6 MG/DL (ref 0.7–1.3)
DEPRECATED RDW RBC AUTO: 21.4 % (ref 11.9–15.9)
EOSINOPHIL # BLD: 8.2 % (ref 0–4.5)
GLUCOSE SERPL-MCNC: 86 MG/DL (ref 74–106)
HCT VFR BLD CALC: 26.9 % (ref 35.4–49)
HGB BLD-MCNC: 8.8 GM/DL (ref 11.7–16.9)
LYMPHOCYTES # BLD: 13.9 % (ref 8–40)
MAGNESIUM SERPL-MCNC: 2.3 MG/DL (ref 1.8–2.4)
MCH RBC QN AUTO: 24.9 PG (ref 25.7–33.7)
MCHC RBC AUTO-ENTMCNC: 32.7 G/DL (ref 32–35.9)
MCV RBC: 76.3 FL (ref 80–96)
MONOCYTES # BLD AUTO: 10.1 % (ref 3.8–10.2)
NEUTROPHILS # BLD: 67.1 % (ref 42.8–82.8)
PHOSPHATE SERPL-MCNC: 4.5 MG/DL (ref 2.5–4.9)
PLATELET # BLD AUTO: 582 K/MM3 (ref 134–434)
PMV BLD: 7.6 FL (ref 7.5–11.1)
POTASSIUM SERPLBLD-SCNC: 5 MMOL/L (ref 3.5–5.1)
RBC # BLD AUTO: 3.53 M/MM3 (ref 4–5.6)
SODIUM SERPL-SCNC: 137 MMOL/L (ref 136–145)
WBC # BLD AUTO: 9.6 K/MM3 (ref 4–10)

## 2018-03-14 RX ADMIN — ASPIRIN 81 MG SCH MG: 81 TABLET ORAL at 09:48

## 2018-03-14 RX ADMIN — AMLODIPINE BESYLATE SCH MG: 5 TABLET ORAL at 09:48

## 2018-03-14 RX ADMIN — TRAZODONE HYDROCHLORIDE SCH MG: 50 TABLET ORAL at 22:15

## 2018-03-14 RX ADMIN — HEPARIN SODIUM SCH: 5000 INJECTION, SOLUTION INTRAVENOUS; SUBCUTANEOUS at 02:13

## 2018-03-14 RX ADMIN — ESCITALOPRAM SCH MG: 20 TABLET, FILM COATED ORAL at 09:48

## 2018-03-14 RX ADMIN — CARVEDILOL SCH MG: 25 TABLET, FILM COATED ORAL at 22:15

## 2018-03-14 RX ADMIN — Medication SCH TAB: at 13:55

## 2018-03-14 RX ADMIN — METHADONE HYDROCHLORIDE SCH MG: 10 TABLET ORAL at 11:30

## 2018-03-14 RX ADMIN — HEPARIN SODIUM SCH: 5000 INJECTION, SOLUTION INTRAVENOUS; SUBCUTANEOUS at 09:51

## 2018-03-14 RX ADMIN — HEPARIN SODIUM SCH UNIT: 5000 INJECTION, SOLUTION INTRAVENOUS; SUBCUTANEOUS at 09:48

## 2018-03-14 RX ADMIN — CARVEDILOL SCH MG: 25 TABLET, FILM COATED ORAL at 09:48

## 2018-03-14 RX ADMIN — HEPARIN SODIUM SCH: 5000 INJECTION, SOLUTION INTRAVENOUS; SUBCUTANEOUS at 17:33

## 2018-03-14 NOTE — CONS
DATE OF CONSULTATION:  03/14/2018

 

REFERRING PHYSICIAN:  _____

 

History is obtained from chart.  Patient is sedated.  The patient is a 48-year-old

black male with a past medical history of hypertension, history of pulmonary TB

treated in 1996 for nine months at [facility], abdominal aortic aneurysm status post stent

placement, congestive heart failure, opioid dependence currently on methadone,

history of heroin abuse, alcohol who was admitted to [facility]

status post abnormal chest x-ray at [facility].  Patient went to [facility] for detox. 

He had a chest x-ray performed which was abnormal.  He subsequently underwent a CAT

scan of the chest which was abnormal, possibly consistent with TB, at which time, he

was transferred to [facility] for further management.  It is unknown

whether or not patient has a cough, fevers, weight loss, or night sweats.  I cannot

obtain any history from him at this point in time.

 

PAST MEDICAL HISTORY:  Again includes abdominal aortic aneurysm status post stent

placement, history of pulmonary TB, history of left hip replacement, CHF, opioid

dependence, heroin abuse, EtOH abuse, depression, aortic stent, history of tobacco

use unknown quantity.

 

REVIEW OF SYSTEMS:  Unable to obtain.

 

CURRENT MEDICATIONS:  Include Lexapro, heparin, _____, valium, Coreg, Colace,

MiraLAX, aspirin, methadone, and vitamin B1.

 

PHYSICAL EXAMINATION:

General:  The patient is a well-developed, well-nourished black male, lethargic

secondary to medications but in no acute distress.

Vital Signs:  He is afebrile.  Respiratory rate is 20.  O2 saturation is 98 on room

air.  Blood pressure is 98/54.

HEENT:  Normocephalic, atraumatic.

Neck:  Supple.

Heart:  Regular.  S1, S2.

Chest:  Poor inspiratory effort.

Abdomen:  Soft.  Bowel sounds are positive.

Extremities:  No cyanosis or edema.

 

LABORATORIES:  WBC is 9.6, hemoglobin 8.8, hematocrit 26.9 with a platelet count of

582,000; there are 67 polys, 13 lymphs, 10 monocytes, and 8 eosinophils. 

Chemistries:  BUN 35, creatinine 1.6.

 

Chest CT with patchy infiltrates in the anterior and posterior segments of the right

upper lobe and a consolidation in superior segment of the right lower lobe and small

patchy infiltrates in the anterior and posterior segments of the left upper lobe and

COPD changes bilaterally.

 

IMPRESSION:

1.  Bilateral infiltrates, cannot exclude possible re-activation of tuberculosis.

2.  History of tuberculosis.

3.  Chronic obstructive pulmonary disease.

4.  Substance abuse.

5.  Hypertension.

6.  Abdominal aortic aneurysm, status post stent.

 

PLAN:  Do sputum for AFB and culture.  If unable to obtain adequate sputum, will

perform flexible bronchoscopy.  Continue respiratory isolation.

 

 

MONICO ROMERO M.D.

 

[id number]

DD: 03/14/2018 15:28

DT: 03/14/2018 18:43

Job #:  [id number]

## 2018-03-14 NOTE — PN
Teaching Attending Note


Name of Resident: Manav Tucker





ATTENDING PHYSICIAN STATEMENT





I saw and evaluated the patient.


I reviewed the resident's note and discussed the case with the resident.


I agree with the resident's findings and plan as documented.








SUBJECTIVE: Patient is lethargic.








OBJECTIVE:


 Vital Signs











 Period  Temp  Pulse  Resp  BP Sys/Ang  Pulse Ox


 


 Last 24 Hr  98.1 F-98.7 F  84-90  18-20  /54-78  98-98








HEART: S1S2, RRR


LUNGS: Clear


ABDOMEN: Soft, non-tender, non-distended, normal BS


EXTREMITIES: No edema








 Laboratory Results - last 24 hr











  03/14/18 03/14/18





  06:20 06:20


 


WBC  9.6 


 


RBC  3.53 L 


 


Hgb  8.8 L 


 


Hct  26.9 L 


 


MCV  76.3 L 


 


MCH  24.9 L 


 


MCHC  32.7 


 


RDW  21.4 H 


 


Plt Count  582 H 


 


MPV  7.6 


 


Neutrophils %  67.1 


 


Lymphocytes %  13.9 


 


Monocytes %  10.1 


 


Eosinophils %  8.2 H D 


 


Basophils %  0.7 


 


Sodium   137


 


Potassium   5.0


 


Chloride   102


 


Carbon Dioxide   23


 


Anion Gap   12


 


BUN   35 H D


 


Creatinine   1.6 H D


 


Random Glucose   86  D


 


Calcium   8.5


 


Phosphorus   4.5


 


Magnesium   2.3








 Current Medications











Generic Name Dose Route Start Last Admin





  Trade Name Freq  PRN Reason Stop Dose Admin


 


Amlodipine Besylate  10 mg  03/13/18 21:30  03/14/18 09:48





  Norvasc -  PO   10 mg





  DAILY AIDA   Administration


 


Aspirin  81 mg  03/13/18 21:30  03/14/18 09:48





  Asa -  PO   81 mg





  DAILY AIDA   Administration


 


Carvedilol  25 mg  03/13/18 22:00  03/14/18 09:48





  Coreg -  PO   25 mg





  BID AIDA   Administration


 


Diazepam  5 mg  03/14/18 10:45  03/14/18 11:46





  Valium -  PO   5 mg





  Q6H PRN   Administration





  WITHDRAWAL(CONT SUBST)   


 


Docusate Sodium  100 mg  03/13/18 20:27  





  Colace -  PO   





  BID PRN   





  CONSTIPATION   


 


Escitalopram Oxalate  20 mg  03/13/18 21:30  03/14/18 09:48





  Lexapro -  PO   20 mg





  DAILY AIDA   Administration


 


Heparin Sodium (Porcine)  5,000 unit  03/13/18 20:30  03/14/18 09:51





  Heparin -  SQ   Not Given





  Q8H-IV AIDA   


 


Methadone HCl  10 mg  03/14/18 11:00  03/14/18 11:30





  Dolophine -  PO  03/20/18 10:59  10 mg





  DAILY@0600 AIDA   Administration


 


Polyethylene Glycol  17 gm  03/13/18 20:27  





  Miralax (For Daily Use) -  PO   





  DAILY PRN   





  CONSTIPATION   


 


Prenatal Multivit/Folic Acid/Iron  1 tab  03/14/18 10:00  03/14/18 13:55





  Prenatal Vitamins (Sjr) -  PO   1 tab





  DAILY AIDA   Administration


 


Thiamine HCl  100 mg  03/14/18 22:00  





  Vitamin B1 -  PO   





  HS AIDA   


 


Trazodone HCl  50 mg  03/13/18 22:00  03/13/18 22:36





  Desyrel -  PO   50 mg





  HS AIDA   Administration


 


Zolpidem Tartrate  10 mg  03/14/18 11:08  





  Ambien -  PO   





  HS PRN   





  INSOMNIA   














ASSESSMENT AND PLAN:


This is a 48 year old man with a history of AAA, depression, CHF, opioid 

dependence who was sent to the ED from [facility] for positive PPD, abnormal CXR

, leukocytosis, anemia.





1. 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


   - Sputum AFB


   - Quantiferon gold


   - Maintain isolation


   - ID and pulmonary consults appreciated


2. Opioid dependence


   - Continue Methadone


3. Continuous alcohol dependence


   - Continue Valium as needed for withdrawal


   - Continue thiamine, folic acid, MVI


4. Acute kidney injury on stage 3 CKD


   - Improving


5. HTN


   - Continue Norvasc, Coreg


6. History of CHF


7. History of AAA and endovascular repair


8. Nicotine dependence


9. Depression


   - Continue Lexapro, Trazodone


10. Anemia, acute on chronic microcytic


   - Check iron studies, stool occult blood

## 2018-03-14 NOTE — CONSULT
Consult Detox [facility]


Reason for Current Admission/Consult: substance use


Referred by:: kellen bowen MD





- History


History of Present Illness: 





47 yo m known to m was [facility] when his routine screening cxr 

for PPD+ result showed infiltrates and his wbc was elevated w a fall in ihis cbc

, no admittedd for pneumonia r/u active TB.,anemai work up. Patient appears 

congested, nad, and ax3 c/o anxiety and tremros. would like to stay on 

methadone 10mg daily with prn [id number]  while hosptialized, not sure if he can 

return for rehab depends on holw long he is hopstialized





- History Source


History Provided By: Patient, Medical Record, Caregiver


Limitations to Obtaining History: No Limitations





- Alcohol/Substance Use


Hx Alcohol Use: Yes


Hx Substance Use: Yes


Hx Substance Use Treatment: Yes





- Current Drug/Alcohol Use


  ** Oxycontin


Route: Oral


Frequency: Daily


Date of Last Use: 18





  ** Alcohol


Route: Oral


Frequency: Daily


Amount used: 1-2 pints


Date of Last Use: 18





- Past Medical History


Cardio/Vascular: Yes: Aneurysm (Aortic aneurysm), CHF


Pulmonary: Yes: Pneumonia, Other (Prior exposure to TB)





- Past Surgical History


Past Surgical History: Yes: AAA Repair (Endovascular aortic stent), Joint 

Replacement (Total hip replacement)





- Significant


Medical Findings: 





47 yo m at end ofi npatien dtox fro opioids and alcohol transferred[facility] 

for work up of pneumonia r/o active tb, anemia, and eleaVTED WBS. MEDICALLYS 

TABLE, NO SIGNS OF WITHDRWAL BUT AGIATATED AND ANXIEOUOS AT TIME





COWS





- Scale


Resting Pulse: 1= NM 


Sweatin= No chills or Flushing


Restless Observation: 0= Sits Still


Pupil Size: 0= Normal to Room Light


Bone or Joint Aches: 1= Mild Discomfort


Runny Nose/ Eye Tearin= None


GI Upset > 30mins: 0= None


Tremor Observation: 0= None


Yawning Observation: 0= None


Anxiety or Irritability: 2=Irritable/Anxious


Goose Flesh Skin: 0=Smooth Skin


COWS Score: 4





CIWA Score





- CIWA Score


Nausea/Vomitin-Mild Nausea/No Vomiting


Muscle Tremors: 1-None Visible, but Felt


Anxiety: 3


Agitation: 3


Paroxysmal Sweats: No Perspiration


Orientation: 0-Oriented


Tacttile Disturbances: 0-None


Auditory Disturbances: 0-None


Visual Disturbances: 0-None


Headache: 0-None Present


CIWA-Ar Total Score: 8





Assessment Plan





- Diagnosis


(1) Pneumonia


Status: Acute   


Qualifiers: 


   Pneumonia type: due to unspecified organism   Laterality: right   Lung 

location: upper lobe of lung   Qualified Code(s): J18.1 - Lobar pneumonia, 

unspecified organism   





(2) Alcohol dependence with uncomplicated withdrawal


Status: Acute   





(3) Cocaine dependence


Status: Acute   





(4) Opioid dependence


Status: Acute   


Qualifiers: 


   Substance use status: uncomplicated   Qualified Code(s): F11.20 - Opioid 

dependence, uncomplicated   





(5) CHF (congestive heart failure)


Status: Chronic   





(6) Depressive disorder


Status: Chronic   Comment: As per self-report and existing records.On 

medications.OPD care at [facility].   





(7) HTN (hypertension)


Status: Chronic   


Qualifiers: 


   Hypertension type: essential hypertension   Qualified Code(s): I10 - 

Essential (primary) hypertension   





(8) Nicotine dependence


Status: Chronic   


Qualifiers: 


   Nicotine product type: cigarettes   Substance use status: uncomplicated   

Qualified Code(s): F17.210 - Nicotine dependence, cigarettes, uncomplicated   





(9) Rheumatoid arthritis


Status: Chronic   


Qualifiers: 


   Rheumatoid arthritis location: multiple sites   Rheumatoid factor presence: 

unspecified presence   Qualified Code(s): M06.9 - Rheumatoid arthritis, 

unspecified   





(10) Status post left hip replacement


Status: Chronic   





(11) Drug-induced mood disorder


Status: Suspected   





(12) History of repair of dissecting aneurysm of descending thoracic aorta


Status: Resolved   





- Plan


Plan: 





CHART, IMAGINA, AND LABS REVIEWED, DISCUSSED DE[facility] MEDICAL TEAM, PATIET 

3XAMINED ADN HISTORY TAKEN.





[id number];


1. FLUIDS, VITAMINS, ANEMAI WORK UP


2. OUD - COTN METHADONE 10MG DAILY, 


3. ALCOHOL USE DISORDER - VALIUM 5MG PRN FOR ANXIETY AQGITATION


4. INSOMAN - AMBIEN ORDERED.


5. [facility]





CAN RETURN TO [facility] FOR INANPTET REHAB WHEN MEDICALLY CLEARED IF PATIENT IS 

IN AGREEMENT.





JANEE Sood md


[phone]





- Medication


Detox Regimen/Protocol: Methadone/Valium

## 2018-03-14 NOTE — EKG
Test Reason : 

Blood Pressure : ***/*** mmHG

Vent. Rate : 086 BPM     Atrial Rate : 086 BPM

   P-R Int : 158 ms          QRS Dur : 082 ms

    QT Int : 376 ms       P-R-T Axes : 057 010 036 degrees

   QTc Int : 449 ms

 

*** POOR DATA QUALITY, INTERPRETATION MAY BE ADVERSELY AFFECTED

NORMAL SINUS RHYTHM

MODERATE VOLTAGE CRITERIA FOR LVH, MAY BE NORMAL VARIANT

BORDERLINE ECG

WHEN COMPARED WITH ECG OF 09-MAR-2018 19:04,

NO SIGNIFICANT CHANGE WAS FOUND

Confirmed by FER GREEN MD ([id number]) on 3/14/2018 10:48:53 AM

 

Referred By:             Confirmed By:FER GREEN MD

## 2018-03-14 NOTE — PN
Physical Exam: 


SUBJECTIVE: Patient seen and examined by me this AM





- No overnight events. Denies all symptoms. Unwilling to answer questions 

appropriately during interview. Wants breakfast. 








OBJECTIVE:





 Vital Signs


 Intake & Output











 03/11/18 03/12/18 03/13/18 03/14/18





 23:59 23:59 23:59 23:59


 


Weight   72.575 kg 

















 Period  Temp  Pulse  Resp  BP Sys/Ang  Pulse Ox


 


 Last 24 Hr  98.1 F-98.9 F  81-88  17-20  /54-77  97-98











GENERAL: The patient is awake, alert, and fully oriented, in no acute distress.


HEAD: Normal with no signs of trauma.


EYES: PERRL, extraocular movements intact, sclera anicteric, conjunctiva clear. 

No ptosis. 


ENT: Ears normal, nares patent, oropharynx clear without exudates, moist mucous 

membranes.


NECK: Trachea midline, full range of motion, supple. 


LUNGS: Poor effort. No respiratory distress or accessory muscle use. BL upper 

lobe trace crackles, decreased air entry. 


HEART: Regular rate and rhythm, S1, S2 without murmur, rub or gallop.


ABDOMEN: Soft, nontender, nondistended, normoactive bowel sounds, no guarding, 

no rebound, no hepatosplenomegaly, no masses.


EXTREMITIES: 2+ pulses, warm, well-perfused, no edema. Multiple linear scars on 

R forearm extensor surface. Healed eschar on extensor surface of L forearm. 


NEUROLOGICAL: Cranial nerves II through XII grossly intact. Normal speech, gait 

not 


observed.


PSYCH: Flat affect


SKIN: L lateral forearm with small eschar, likely prior injection site. 








 


 CBC, BMP





 03/14/18 06:20 





 03/14/18 06:20 








Active Medications











Generic Name Dose Route Start Last Admin





  Trade Name Freq  PRN Reason Stop Dose Admin


 


Amlodipine Besylate  10 mg  03/13/18 21:30  03/13/18 22:36





  Norvasc -  PO   10 mg





  DAILY AIDA   Administration


 


Aspirin  81 mg  03/13/18 21:30  03/13/18 22:35





  Asa -  PO   81 mg





  DAILY AIDA   Administration


 


Carvedilol  25 mg  03/13/18 22:00  03/13/18 22:36





  Coreg -  PO   25 mg





  BID AIDA   Administration


 


Diazepam  2 mg  03/13/18 21:04  03/13/18 22:55





  Valium -  PO   2 mg





  Q6H PRN   Administration





  WITHDRAWAL(CONT SUBST)   


 


Docusate Sodium  100 mg  03/13/18 20:27  





  Colace -  PO   





  BID PRN   





  CONSTIPATION   


 


Escitalopram Oxalate  20 mg  03/13/18 21:30  03/13/18 22:36





  Lexapro -  PO   20 mg





  DAILY AIDA   Administration


 


Heparin Sodium (Porcine)  5,000 unit  03/13/18 20:30  03/14/18 02:13





  Heparin -  SQ   Not Given





  Q8H-IV AIDA   


 


Methadone HCl  10 mg  03/14/18 06:00  





  Dolophine -  PO  03/14/18 10:00  





  DAILY@0600 AIDA   


 


Polyethylene Glycol  17 gm  03/13/18 20:27  





  Miralax (For Daily Use) -  PO   





  DAILY PRN   





  CONSTIPATION   


 


Thiamine HCl  100 mg  03/13/18 20:45  03/13/18 22:35





  Vitamin B1 -  PO   100 mg





  DAILY AIDA   Administration


 


Trazodone HCl  50 mg  03/13/18 22:00  03/13/18 22:36





  Desyrel -  PO   50 mg





  HS AIDA   Administration








Micro pending





Chest CT 3/14 - IMPRESSION: Nonspecific bilateral pulmonary infiltrates are 

noted as discussed above which may be on the basis of tuberculosis. Paraseptal 

emphysema. Probable superimposed centrilobular emphysema (versus representing 

subcentimeter postinflammatory/ postinfectious blebs). Mild nonspecific 

mediastinal lymphadenopathy. Atherosclerotic coronary artery calcifications 

which are at least moderate in degree. Endovascular aortic stent in place





ASSESSMENT/PLAN:


48 year old male with a hx of HTN, AAA, CHF, opioid abuse, TB exposure is 

admitted to the hospital to r/o TB/possible PNA in setting of 15lb weight loss 

over last three months and night sweats for past few days at [facility]. Pt 

poorly cooperative with medical team, giving limited responses during 

interview. 





#Pneumonia- unlikely community acquired PNA given no symptoms, could be TB 

based on CXR/CT


-eftriaxone/azithromycin in ED


-No abx currently; f/u cultures


-ID consult. Recs appreciated


-CT results listed above, cannot r/o TB


-Sputum cultures


-f/u AFB smears x3


-Quantiferon gold


-repeat EKG for prolonged QT


-airborne isolation precautions


- Pulm consulted, recommends TB work-up precaution


- possible flex bronch





#Detox- was at [facility] for heroin and alcohol detox; no withdrawal symptoms


- Methadone 10mg qD


- valium prn q6h for withdrawal


- miralax/colace if constipated 


- Dr. Delaney consulted, recs appreciated





#YONATHAN - 2.4 on admission at [facility]; 1.6 today


-urine lytes in AM


- Trend Cr


- avoid nephrotoxic agents





#Microcytic Anemia: stable


- iron studies


-Trend H/H


- transfuse at <7


- f/u FOBT


- iron supp





#alcohol abuse - monitor for withdrawals


-c/w thiamine, folate, MVI





#Hypertension- stable


-continue amlodipine 10mg PO QD


-continue carvedilol 25m PO BID





#S/P Aortic repair - 


- continue aspirin 81mg QD


- f/u imaging results





#Depression: stable


-lexapro 20mg PO QD


-trazadone 50mg PO HS





#Insomnia


- Ambien 10mg





#FEN


PO hydration


Daily BMPs


Na restricted diet





#Prophylaxis


-heparin 5000 subq TID





#Disposition


M/S





Plan discussed with attending, Dr. Brett Tucker, PGY1








Visit type





- Emergency Visit


Emergency Visit: Yes


ED Registration Date: 03/13/18


Care time: The patient presented to the Emergency Department on the above date 

and was hospitalized for further evaluation of their emergent condition.





- New Patient


This patient is new to me today: Yes


Date on this admission: 03/14/18





- Critical Care


Critical Care patient: No

## 2018-03-14 NOTE — PN
Progress Note, Physician


Chief Complaint: 





ID











48 year old male admitted from detox unit for an abnormal chest xray.  HE has a 

polysubstance abuse history though does not inject drugs.


Noted to have an abnormal chest xray routine leading to a chest CT which showed 

emphysema with ? chronic changes raising the question of TB.   The patient was 

treated for latent TB 1996 and finished 9 months treatment. He is HIV negative 

here and Hep C negative.  He is not couphing and has no sweats fevers STEVE 

sputum or hemoptysis.  HE does not know why he was sent to hospital as he is 

asymptomatic.  Past history of anemia ?etiology, stenting of thoracic aneursym 

[facility] while incarcerated, HPTN, CKD left hip replacement 








- Current Medication List


Current Medications: 


Active Medications





Amlodipine Besylate (Norvasc -)  10 mg PO DAILY [facility]


   Last Admin: 03/13/18 22:36 Dose:  10 mg


Aspirin (Asa -)  81 mg PO DAILY [facility]


   Last Admin: 03/13/18 22:35 Dose:  81 mg


Carvedilol (Coreg -)  25 mg PO BID [facility]


   Last Admin: 03/13/18 22:36 Dose:  25 mg


Diazepam (Valium -)  2 mg PO Q6H PRN


   PRN Reason: WITHDRAWAL(CONT SUBST)


   Last Admin: 03/13/18 22:55 Dose:  2 mg


Docusate Sodium (Colace -)  100 mg PO BID PRN


   PRN Reason: CONSTIPATION


Escitalopram Oxalate (Lexapro -)  20 mg PO DAILY [facility]


   Last Admin: 03/13/18 22:36 Dose:  20 mg


Heparin Sodium (Porcine) (Heparin -)  5,000 unit SQ Q8H-IV [facility]


   Last Admin: 03/14/18 02:13 Dose:  Not Given


Methadone HCl (Dolophine -)  10 mg PO DAILY@0600 [facility]


   Stop: 03/14/18 10:00


Polyethylene Glycol (Miralax (For Daily Use) -)  17 gm PO DAILY PRN


   PRN Reason: CONSTIPATION


Thiamine HCl (Vitamin B1 -)  100 mg PO DAILY [facility]


   Last Admin: 03/13/18 22:35 Dose:  100 mg


Trazodone HCl (Desyrel -)  50 mg PO HS [facility]


   Last Admin: 03/13/18 22:36 Dose:  50 mg











- Objective


Vital Signs: 


 Vital Signs











Temperature  98.3 F   03/14/18 06:12


 


Pulse Rate  84   03/14/18 06:12


 


Respiratory Rate  20   03/14/18 06:12


 


Blood Pressure  98/54   03/14/18 06:12


 


O2 Sat by Pulse Oximetry (%)  98   03/13/18 22:47











Constitutional: Yes: No Distress


Cardiovascular: Yes: Regular Rate and Rhythm, S1, S2.  No: Murmur


Respiratory: Yes: WNL, Regular, CTA Bilaterally.  No: Rales, Rhonchi


Gastrointestinal: Yes: WNL, Normal Bowel Sounds.  No: Tenderness, Tenderness, 

Rebound


Edema: No


Labs: 


 CBC, BMP





 03/14/18 06:20 











Assessment/Plan





Assessment





Polysubstance abuse


Abnormal chest xray CT probably chronic changes from emphysema cystic changes 

TB unlikely


Treatment for Latent TB


Thoracic anuersym stent


Iron deficiency anemia


Left hip replacement


Rheumatoid arthritis











Plan I do not feel patient needs to stay in the hospital for evaluation of TB. 

That said I would get pulmonary to see him and offer theri


opinion in this regard and perhaps he can go back to the Detox unit with outpt 

followup of his medical issues anemia ect

## 2018-03-14 NOTE — PN
Progress Note (short form)





- Note


Progress Note: 





PULMONARY





IMP BILATERAL PATCHY UPPER LOBE INFILTRATES >CANNOT EXCLUDE TB REACTIVATION


      H/O TB


      AAA S/P STENT


      ASHD


      CHF


      SUBSTANCE ABUSE


     YONATHAN





PLAN  [employer]


         ISOLATION


         CULTURES


         POSSIBLE FLEX BRONCH


         MONITOR LYTES ,RENAL FUNCTION


         DETOX PROTOCOL





DR ROMERO











Problem List





- Problems


(1) H/O TB (tuberculosis)


Code(s): Z86.11 - PERSONAL HISTORY OF TUBERCULOSIS   





(2) Alcohol dependence with uncomplicated withdrawal


Code(s): F10.230 - ALCOHOL DEPENDENCE WITH WITHDRAWAL, UNCOMPLICATED   





(3) Ascending aortic aneurysm


Code(s): I71.2 - THORACIC AORTIC ANEURYSM, WITHOUT RUPTURE   





(4) Cocaine dependence


Code(s): F14.20 - COCAINE DEPENDENCE, UNCOMPLICATED   





(5) CHF (congestive heart failure)


Code(s): I50.9 - HEART FAILURE, UNSPECIFIED   





(6) HTN (hypertension)


Code(s): I10 - ESSENTIAL (PRIMARY) HYPERTENSION   


Qualifiers: 


   Hypertension type: essential hypertension   Qualified Code(s): I10 - 

Essential (primary) hypertension   





(7) Nicotine dependence


Code(s): F17.200 - NICOTINE DEPENDENCE, UNSPECIFIED, UNCOMPLICATED   


Qualifiers: 


   Nicotine product type: cigarettes   Substance use status: uncomplicated   

Qualified Code(s): F17.210 - Nicotine dependence, cigarettes, uncomplicated   





(8) Status post left hip replacement


Code(s): Z96.642 - PRESENCE OF LEFT ARTIFICIAL HIP JOINT   





(9) History of repair of dissecting aneurysm of descending thoracic aorta


Code(s): Z98.890 - OTHER SPECIFIED POSTPROCEDURAL STATES; Z86.79 - PERSONAL 

HISTORY OF OTHER DISEASES OF THE CIRCULATORY SYSTEM

## 2018-03-15 VITALS — SYSTOLIC BLOOD PRESSURE: 130 MMHG | TEMPERATURE: 98 F | DIASTOLIC BLOOD PRESSURE: 84 MMHG | HEART RATE: 64 BPM

## 2018-03-15 LAB
ALBUMIN SERPL-MCNC: 2.7 G/DL (ref 3.4–5)
ALP SERPL-CCNC: 115 U/L (ref 45–117)
ALT SERPL-CCNC: 9 U/L (ref 12–78)
ANION GAP SERPL CALC-SCNC: 10 MMOL/L (ref 8–16)
AST SERPL-CCNC: 12 U/L (ref 15–37)
BILIRUB SERPL-MCNC: 0.2 MG/DL (ref 0.2–1)
BUN SERPL-MCNC: 31 MG/DL (ref 7–18)
CALCIUM SERPL-MCNC: 8.6 MG/DL (ref 8.5–10.1)
CHLORIDE SERPL-SCNC: 104 MMOL/L (ref 98–107)
CO2 SERPL-SCNC: 25 MMOL/L (ref 21–32)
CREAT SERPL-MCNC: 1.5 MG/DL (ref 0.7–1.3)
DEPRECATED RDW RBC AUTO: 20.9 % (ref 11.9–15.9)
GLUCOSE SERPL-MCNC: 72 MG/DL (ref 74–106)
HCT VFR BLD CALC: 29.3 % (ref 35.4–49)
HGB BLD-MCNC: 9.3 GM/DL (ref 11.7–16.9)
MCH RBC QN AUTO: 24.6 PG (ref 25.7–33.7)
MCHC RBC AUTO-ENTMCNC: 31.9 G/DL (ref 32–35.9)
MCV RBC: 77 FL (ref 80–96)
PLATELET # BLD AUTO: 687 K/MM3 (ref 134–434)
PMV BLD: 7.7 FL (ref 7.5–11.1)
POTASSIUM SERPLBLD-SCNC: 5.5 MMOL/L (ref 3.5–5.1)
PROT SERPL-MCNC: 6.9 G/DL (ref 6.4–8.2)
RBC # BLD AUTO: 3.8 M/MM3 (ref 4–5.6)
SERUM IRON SATURATION: 4 % (ref 15–55)
SODIUM SERPL-SCNC: 139 MMOL/L (ref 136–145)
TIBC SERPL-MCNC: 226 UG/DL (ref 250–450)
UIBC SERPL-MCNC: 216 UG/DL (ref 111–343)
WBC # BLD AUTO: 9 K/MM3 (ref 4–10)

## 2018-03-15 RX ADMIN — Medication SCH TAB: at 09:54

## 2018-03-15 RX ADMIN — METHADONE HYDROCHLORIDE SCH MG: 10 TABLET ORAL at 06:10

## 2018-03-15 RX ADMIN — ESCITALOPRAM SCH MG: 20 TABLET, FILM COATED ORAL at 09:54

## 2018-03-15 RX ADMIN — HEPARIN SODIUM SCH UNIT: 5000 INJECTION, SOLUTION INTRAVENOUS; SUBCUTANEOUS at 09:54

## 2018-03-15 RX ADMIN — AMLODIPINE BESYLATE SCH MG: 5 TABLET ORAL at 09:54

## 2018-03-15 RX ADMIN — CARVEDILOL SCH MG: 25 TABLET, FILM COATED ORAL at 09:54

## 2018-03-15 RX ADMIN — HEPARIN SODIUM SCH: 5000 INJECTION, SOLUTION INTRAVENOUS; SUBCUTANEOUS at 01:27

## 2018-03-15 RX ADMIN — ASPIRIN 81 MG SCH MG: 81 TABLET ORAL at 09:54

## 2018-03-15 NOTE — DS
Physical Exam: 


SUBJECTIVE: Patient seen and examined





- Pt somnolent, poorly cooperative during interview; A&Ox2; denies f/c/n/v/d; 

denies f/c/n/v/d, night sweats, cough, SOB, ab pain, rashes; Endorses mild 

chest discomfort with deep inspiration;


- pt agitated w/ staff overnight; afebrile, persistently hypotensive to 90s 

systolic; Per pulm recs, suggest possible flex bronchoscopy to further 

evaluation lung pathology; anemia likely ACD





OBJECTIVE:





 Vital Signs


 Intake & Output











 03/12/18 03/13/18 03/14/18 03/15/18





 23:59 23:59 23:59 23:59


 


Intake Total    500


 


Balance    500


 


Weight  72.575 kg  

















 Period  Temp  Pulse  Resp  BP Sys/Ang  Pulse Ox


 


 Last 24 Hr  98.0 F-98.6 F    18-20  /62-84  98








PHYSICAL EXAM





GENERAL: A&Ox2, somnolent


HEAD: Normal with no signs of trauma.


EYES: PERRL, extraocular movements intact, sclera anicteric, conjunctiva clear. 

No ptosis. 


ENT: Ears normal, nares patent, oropharynx clear without exudates, moist mucous 

membranes.


NECK: Trachea midline, full range of motion, supple. 


LUNGS: No respiratory distress or accessory muscle use. BL upper lobe crackles, 

decreased air entry. Bibasilar trace rhonchi. 


HEART: 2/6 systolic ejection murmur, best heard at LUSB. Regular rate and rhythm

, S1, S2 without murmur, rub or gallop.


ABDOMEN: Soft, nontender, nondistended, normoactive bowel sounds, no guarding, 

no rebound, no hepatosplenomegaly, no masses.


EXTREMITIES: 2+ pulses, warm, well-perfused, no edema. Multiple linear scars on 

R forearm extensor surface. Healed eschar on extensor surface of L forearm. 


NEUROLOGICAL: Cranial nerves II through XII grossly intact. Normal speech, gait 

not 


observed.


PSYCH: Flat affect


SKIN: L lateral forearm with small eschar, likely prior injection site. 


LABS


 Laboratory Results - last 24 hr


 CBC, BMP





 03/15/18 10:18 





 03/15/18 10:18 














  03/14/18 03/14/18 03/15/18





  06:20 06:20 10:18


 


WBC    9.0


 


RBC    3.80 L


 


Hgb    9.3 L


 


Hct    29.3 L


 


MCV    77.0 L


 


MCH    24.6 L


 


MCHC    31.9 L


 


RDW    20.9 H


 


Plt Count    687 H


 


MPV    7.7


 


Sodium   


 


Potassium   


 


Chloride   


 


Carbon Dioxide   


 


Anion Gap   


 


BUN   


 


Creatinine   


 


Creat Clearance w eGFR   


 


Random Glucose   


 


Calcium   


 


Iron   10 L 


 


TIBC   226 L 


 


Iron Saturation   4 L 


 


Transferrin  179 L  


 


Total Bilirubin   


 


AST   


 


ALT   


 


Alkaline Phosphatase   


 


Total Protein   


 


Albumin   














  03/15/18





  10:18


 


WBC 


 


RBC 


 


Hgb 


 


Hct 


 


MCV 


 


MCH 


 


MCHC 


 


RDW 


 


Plt Count 


 


MPV 


 


Sodium  139


 


Potassium  5.5 H


 


Chloride  104


 


Carbon Dioxide  25


 


Anion Gap  10


 


BUN  31 H


 


Creatinine  1.5 H


 


Creat Clearance w eGFR  49.54


 


Random Glucose  72 L


 


Calcium  8.6


 


Iron 


 


TIBC 


 


Iron Saturation 


 


Transferrin 


 


Total Bilirubin  0.2  D


 


AST  12 L


 


ALT  9 L D


 


Alkaline Phosphatase  115


 


Total Protein  6.9


 


Albumin  2.7 L





 Microbiology





03/15/18 10:00   Sputum - Expectorated   Gram Stain - Final


03/15/18 10:00   Sputum - Expectorated   AFB Smear Concentration - Preliminary


03/15/18 10:00   Sputum - Expectorated   Direct Acid Fast Bacilli Smear - Final


03/15/18 10:00   Sputum - Expectorated   Mycobacterial Culture - Preliminary





Chest CT 3/14 - IMPRESSION: Nonspecific bilateral pulmonary infiltrates are 

noted as discussed above which may be on the basis of tuberculosis. Paraseptal 

emphysema. Probable superimposed centrilobular emphysema (versus representing 

subcentimeter postinflammatory/ postinfectious blebs). Mild nonspecific 

mediastinal lymphadenopathy. Atherosclerotic coronary artery calcifications 

which are at least moderate in degree. Endovascular aortic stent in place





Consult:


Pulm - Seen by Dr. Lau


ID - Seen by Dr. Banks





[facility] COURSE:





Prehospital course:


48 year old male with a past medical history of hypertension, abdominal aortic 

aneurysm s/p aortic stent, CHF, opioid addiction, TB exposure (1996) brought 

from [facility] for abnormal CXR and labwork. Patient denies any cough, 

shortness of breath, fevers, chills, nausea, vomiting, diarrhea, chest pain, 

abdominal pain. Patient does endorse night sweats over the past 3 nights as 

well as weight loss. He states that he was exposed to TB in the past and was 

treated with one medication, but he denies ever having overt tuberculosis. 

Patient states that he had an episode of pneumonia in the past. He was admitted 

for detox at [facility] on 3/9 for heroin and alcohol use. He completed 4 days 

at [facility] before arriving here. Denies having withdrawal symptoms. Denies 

ever having episode of seizure.





Hospital course:


In ED, pt labs notable for anemia 8.8 hgb, plt 582, increased eos 8.2% and 

increased Cr 1.6, with no other abnormalities. No WBC, fever. Chest Ct with 

results as noted above. Given suspicious chest CT and hx of TB exposure in past

, pt put on isolation, sputum gram stain, cultures and Quantiferon studies 

sent. Pulm and ID consulted, as well as detox team. Per ID, unlikely to be TB, 

recommended f/u all pending studies. Per Pulm, suggested possible PNA given CT 

finding, suggested possible flex bronchoscopy for further work-up. Repeat labs 

notable for hyperkalemia to 5.5. On Day 2 of admission, pt noncompliant with 

medical team, decided to leave AMA as he was concerned about paying his phone 

bill and other concerns. Informed about the risk of leaving before receiving 

adequate medical care, stated he was aware and would return to the hospital if 

his test results were positive. Refused all further medical therapy and left 

AMA. 





Date of Admission:03/13/18





Date of Discharge: 03/15/18





Patient left AMA after being informed of risks of leaving without adequate 

medical treatment. Advised to return to hospital if symptoms worsen or TB test 

results are positive. 





Minutes to complete discharge: 35





Discharge Summary


Reason For Visit: TUBERCULOSIS; PNEUMONIA


Condition: Stable





- Instructions


Diet, Activity, Other Instructions: 


Please return to the emergency department with any new or worsening symptoms or 

concerns. Please follow up with your primary care physician within 72 hours. 





Disposition: AGAINST MEDICAL ADVICE





- Home Medications


Comprehensive Discharge Medication List: 


Ambulatory Orders





Escitalopram Oxalate [Lexapro -] 20 mg PO DAILY #30 tablet 03/10/18 


traZODone HCL [Desyrel -] 50 mg PO HS #30 tablet 03/10/18 


Amlodipine Besylate [Norvasc -] 10 mg PO DAILY #30 tablet 03/12/18 


Aspirin [ASA -] 81 mg PO DAILY #30 tab.chew 03/12/18 


Carvedilol [Coreg -] 25 mg PO BID #60 tablet 03/12/18 








This patient is new to me today: No


Emergency Visit: Yes


ED Registration Date: 03/13/18


Care time: The patient presented to the Emergency Department on the above date 

and was hospitalized for further evaluation of their emergent condition.


Critical Care patient: No





- Discharge Referral


Referred to [facility] No

## 2018-03-15 NOTE — PN
Progress Note, Physician


Chief Complaint: 





ID











Asymptomatic  Wants to be discharged





- Current Medication List


Current Medications: 


Active Medications





Amlodipine Besylate (Norvasc -)  10 mg PO DAILY [facility]


   Last Admin: 03/15/18 09:54 Dose:  10 mg


Aspirin (Asa -)  81 mg PO DAILY [facility]


   Last Admin: 03/15/18 09:54 Dose:  81 mg


Carvedilol (Coreg -)  25 mg PO BID [facility]


   Last Admin: 03/15/18 09:54 Dose:  25 mg


Diazepam (Valium -)  5 mg PO Q6H PRN


   PRN Reason: WITHDRAWAL(CONT SUBST)


   Last Admin: 03/15/18 10:00 Dose:  5 mg


Docusate Sodium (Colace -)  100 mg PO BID PRN


   PRN Reason: CONSTIPATION


Escitalopram Oxalate (Lexapro -)  20 mg PO DAILY [facility]


   Last Admin: 03/15/18 09:54 Dose:  20 mg


Heparin Sodium (Porcine) (Heparin -)  5,000 unit SQ Q8H-IV [facility]


   Last Admin: 03/15/18 09:54 Dose:  5,000 unit


Methadone HCl (Dolophine -)  10 mg PO DAILY@0600 [facility]


   Stop: 03/20/18 10:59


   Last Admin: 03/15/18 06:10 Dose:  10 mg


Polyethylene Glycol (Miralax (For Daily Use) -)  17 gm PO DAILY PRN


   PRN Reason: CONSTIPATION


Prenatal Multivit/Folic Acid/Iron (Prenatal Vitamins (Sjr) -)  1 tab PO DAILY 

[facility]


   Last Admin: 03/15/18 09:54 Dose:  1 tab


Thiamine HCl (Vitamin B1 -)  100 mg PO HS [facility]


   Last Admin: 03/14/18 22:15 Dose:  100 mg


Trazodone HCl (Desyrel -)  50 mg PO HS [facility]


   Last Admin: 03/14/18 22:15 Dose:  50 mg


Zolpidem Tartrate (Ambien -)  10 mg PO HS PRN


   PRN Reason: INSOMNIA











- Objective


Vital Signs: 


 Vital Signs











Temperature  98.2 F   03/15/18 10:00


 


Pulse Rate  83   03/15/18 10:00


 


Respiratory Rate  18   03/15/18 10:00


 


Blood Pressure  140/75   03/15/18 10:00


 


O2 Sat by Pulse Oximetry (%)  98   03/14/18 21:00











Constitutional: Yes: No Distress


Cardiovascular: Yes: S1, S2


Respiratory: Yes: WNL, Regular, CTA Bilaterally


Gastrointestinal: Yes: WNL, Normal Bowel Sounds, Soft


Labs: 


 CBC, BMP





 03/15/18 10:18 





 03/15/18 10:18 











Assessment/Plan





Microbiology





03/15/18 10:00   Sputum - Expectorated   AFB Smear Concentration - Preliminary


03/15/18 10:00   Sputum - Expectorated   Mycobacterial Culture - Preliminary





 Laboratory Tests











  03/15/18 03/15/18 03/15/18





  10:18 10:18 10:18


 


WBC  9.0  


 


Hgb  9.3 L  


 


Hct  29.3 L  


 


Plt Count  687 H  


 


BUN   31 H 


 


Creatinine   1.5 H 


 


Creat Clearance w eGFR   49.54 


 


TB Test (QFT)    Pending








Assessment Polysubstance abuse with unusual appearing right sided infitrate PPD 

pos by history


                   Patient is anemic unclear etiology and has elevated 

platelets count ? reactive you could see this in TB


                   but acute phase reactant





Plan              Agree with plan to get Sputum for AFB AND PCR  Consider 

bronchoscopy ESR CRP


                  Note he says he was treated for PNA at [facility] NOV 2017 ? CT done for comparison


Jocelyn GROSS

## 2018-03-15 NOTE — PN
Physical Exam: 


SUBJECTIVE: Patient seen and examined





- Pt somnolent, poorly cooperative during interview; A&Ox2; denies f/c/n/v/d; 

denies f/c/n/v/d, night sweats, cough, SOB, ab pain, rashes; Endorses mild 

chest discomfort with deep inspiration;


- pt agitated w/ staff overnight; afebrile, persistently hypotensive to 90s 

systolic; Per pulm recs, suggest possible flex bronchoscopy to further 

evaluation lung pathology; anemia likely ACD





OBJECTIVE:





 Vital Signs


 Intake & Output











 03/12/18 03/13/18 03/14/18 03/15/18





 23:59 23:59 23:59 23:59


 


Weight  72.575 kg  

















 Period  Temp  Pulse  Resp  BP Sys/Ang  Pulse Ox


 


 Last 24 Hr  98.6 F-98.7 F    18-20  /62-78  98











GENERAL: A&Ox2, somnolent


HEAD: Normal with no signs of trauma.


EYES: PERRL, extraocular movements intact, sclera anicteric, conjunctiva clear. 

No ptosis. 


ENT: Ears normal, nares patent, oropharynx clear without exudates, moist mucous 

membranes.


NECK: Trachea midline, full range of motion, supple. 


LUNGS: No respiratory distress or accessory muscle use. BL upper lobe crackles, 

decreased air entry. Bibasilar trace rhonchi. 


HEART: 2/6 systolic ejection murmur, best heard at LUSB. Regular rate and rhythm

, S1, S2 without murmur, rub or gallop.


ABDOMEN: Soft, nontender, nondistended, normoactive bowel sounds, no guarding, 

no rebound, no hepatosplenomegaly, no masses.


EXTREMITIES: 2+ pulses, warm, well-perfused, no edema. Multiple linear scars on 

R forearm extensor surface. Healed eschar on extensor surface of L forearm. 


NEUROLOGICAL: Cranial nerves II through XII grossly intact. Normal speech, gait 

not 


observed.


PSYCH: Flat affect


SKIN: L lateral forearm with small eschar, likely prior injection site. 














 Laboratory Results - last 24 hr


 CBC, BMP





 03/14/18 06:20 





 03/14/18 06:20 














  03/14/18 03/14/18 03/14/18





  06:20 06:20 06:20


 


WBC  9.6  


 


RBC  3.53 L  


 


Hgb  8.8 L  


 


Hct  26.9 L  


 


MCV  76.3 L  


 


MCH  24.9 L  


 


MCHC  32.7  


 


RDW  21.4 H  


 


Plt Count  582 H  


 


MPV  7.6  


 


Neutrophils %  67.1  


 


Lymphocytes %  13.9  


 


Monocytes %  10.1  


 


Eosinophils %  8.2 H D  


 


Basophils %  0.7  


 


Sodium   137 


 


Potassium   5.0 


 


Chloride   102 


 


Carbon Dioxide   23 


 


Anion Gap   12 


 


BUN   35 H D 


 


Creatinine   1.6 H D 


 


Random Glucose   86  D 


 


Calcium   8.5 


 


Phosphorus   4.5 


 


Magnesium   2.3 


 


Transferrin    179 L








Active Medications











Generic Name Dose Route Start Last Admin





  Trade Name Freq  PRN Reason Stop Dose Admin


 


Amlodipine Besylate  10 mg  03/13/18 21:30  03/14/18 09:48





  Norvasc -  PO   10 mg





  DAILY [facility]   Administration


 


Aspirin  81 mg  03/13/18 21:30  03/14/18 09:48





  Asa -  PO   81 mg





  DAILY [facility]   Administration


 


Carvedilol  25 mg  03/13/18 22:00  03/14/18 22:15





  Coreg -  PO   25 mg





  BID [facility]   Administration


 


Diazepam  5 mg  03/14/18 10:45  03/14/18 11:46





  Valium -  PO   5 mg





  Q6H PRN   Administration





  WITHDRAWAL(CONT SUBST)   


 


Docusate Sodium  100 mg  03/13/18 20:27  





  Colace -  PO   





  BID PRN   





  CONSTIPATION   


 


Escitalopram Oxalate  20 mg  03/13/18 21:30  03/14/18 09:48





  Lexapro -  PO   20 mg





  DAILY [facility]   Administration


 


Heparin Sodium (Porcine)  5,000 unit  03/13/18 20:30  03/15/18 01:27





  Heparin -  SQ   Not Given





  Q8H-IV [facility]   


 


Methadone HCl  10 mg  03/14/18 11:00  03/15/18 06:10





  Dolophine -  PO  03/20/18 10:59  10 mg





  DAILY@0600 [facility]   Administration


 


Polyethylene Glycol  17 gm  03/13/18 20:27  





  Miralax (For Daily Use) -  PO   





  DAILY PRN   





  CONSTIPATION   


 


Prenatal Multivit/Folic Acid/Iron  1 tab  03/14/18 10:00  03/14/18 13:55





  Prenatal Vitamins (Sjr) -  PO   1 tab





  DAILY [facility]   Administration


 


Thiamine HCl  100 mg  03/14/18 22:00  03/14/18 22:15





  Vitamin B1 -  PO   100 mg





  HS [facility]   Administration


 


Trazodone HCl  50 mg  03/13/18 22:00  03/14/18 22:15





  Desyrel -  PO   50 mg





  HS [facility]   Administration


 


Zolpidem Tartrate  10 mg  03/14/18 11:08  





  Ambien -  PO   





  HS PRN   





  INSOMNIA   





micro pending





Chest CT 3/14 - IMPRESSION: Nonspecific bilateral pulmonary infiltrates are 

noted as discussed above which may be on the basis of tuberculosis. Paraseptal 

emphysema. Probable superimposed centrilobular emphysema (versus representing 

subcentimeter postinflammatory/ postinfectious blebs). Mild nonspecific 

mediastinal lymphadenopathy. Atherosclerotic coronary artery calcifications 

which are at least moderate in degree. Endovascular aortic stent in place





ASSESSMENT/PLAN:


48 year old male with a hx of HTN, AAA, CHF, opioid abuse, TB exposure is 

admitted to the hospital to r/o TB/possible PNA in setting of 15lb weight loss 

over last three months and night sweats for past few days at [facility]. Pt 

poorly cooperative with medical team, giving limited responses during 

interview. 





#Pneumonia- unlikely community acquired PNA given no symptoms, could be TB 

based on CXR/CT


-eftriaxone/azithromycin in ED


-No abx currently; f/u cultures


-ID consult. Recs appreciated


-CT results listed above, cannot r/o TB


-Sputum cultures


-f/u AFB smears x3


-Quantiferon gold


-repeat EKG for prolonged QT


-airborne isolation precautions


- Pulm consulted, recommends TB work-up precaution


- possible flex bronch





#Detox- was at [facility] for heroin and alcohol detox; no withdrawal symptoms


- Methadone 10mg qD


- valium prn q6h for withdrawal


- miralax/colace if constipated 


- Dr. Delaney consulted, recs appreciated





#YONATHAN - 2.4 on admission at [facility]; 1.6 today


-urine lytes in AM


- Trend Cr


- avoid nephrotoxic agents





#Microcytic Anemia: stable


- iron studies


-Trend H/H


- transfuse at <7


- f/u FOBT


- iron supp





#alcohol abuse - monitor for withdrawals


-c/w thiamine, folate, MVI





#Hypertension- stable


-continue amlodipine 10mg PO QD


-continue carvedilol 25m PO BID





#S/P Aortic repair - 


- continue aspirin 81mg QD


- f/u imaging results





#Depression: stable


-lexapro 20mg PO QD


-trazadone 50mg PO HS





#Insomnia


- Ambien 10mg





#FEN


PO hydration


Daily BMPs


Na restricted diet





#Prophylaxis


-heparin 5000 subq TID





#Disposition


M/S





Plan discussed with attending, Dr. Brett Tucker, PGY1

## 2018-03-15 NOTE — PN
Progress Note (short form)





- Note


Progress Note: 


Somnolent but arousbale.  Denies complaints but poor historian. 


No acute events overnight. 








 Intake & Output











 03/12/18 03/13/18 03/14/18 03/15/18





 23:59 23:59 23:59 23:59


 


Weight  160 lb  











 Last Vital Signs











Temp Pulse Resp BP Pulse Ox


 


 98.6 F   100 H  20   94/62   98 


 


 03/15/18 06:29  03/15/18 06:29  03/15/18 06:29  03/15/18 06:29  03/14/18 21:00











Active Medications





Amlodipine Besylate (Norvasc -)  10 mg PO DAILY [facility]


   Last Admin: 03/15/18 09:54 Dose:  10 mg


Aspirin (Asa -)  81 mg PO DAILY [facility]


   Last Admin: 03/15/18 09:54 Dose:  81 mg


Carvedilol (Coreg -)  25 mg PO BID [facility]


   Last Admin: 03/15/18 09:54 Dose:  25 mg


Diazepam (Valium -)  5 mg PO Q6H PRN


   PRN Reason: WITHDRAWAL(CONT SUBST)


   Last Admin: 03/15/18 10:00 Dose:  5 mg


Docusate Sodium (Colace -)  100 mg PO BID PRN


   PRN Reason: CONSTIPATION


Escitalopram Oxalate (Lexapro -)  20 mg PO DAILY [facility]


   Last Admin: 03/15/18 09:54 Dose:  20 mg


Heparin Sodium (Porcine) (Heparin -)  5,000 unit SQ Q8H-IV [facility]


   Last Admin: 03/15/18 09:54 Dose:  5,000 unit


Methadone HCl (Dolophine -)  10 mg PO DAILY@0600 [facility]


   Stop: 03/20/18 10:59


   Last Admin: 03/15/18 06:10 Dose:  10 mg


Polyethylene Glycol (Miralax (For Daily Use) -)  17 gm PO DAILY PRN


   PRN Reason: CONSTIPATION


Prenatal Multivit/Folic Acid/Iron (Prenatal Vitamins (Sjr) -)  1 tab PO DAILY 

[facility]


   Last Admin: 03/15/18 09:54 Dose:  1 tab


Thiamine HCl (Vitamin B1 -)  100 mg PO HS [facility]


   Last Admin: 03/14/18 22:15 Dose:  100 mg


Trazodone HCl (Desyrel -)  50 mg PO HS [facility]


   Last Admin: 03/14/18 22:15 Dose:  50 mg


Zolpidem Tartrate (Ambien -)  10 mg PO HS PRN


   PRN Reason: INSOMNIA








GENERAL: Somnolent, NAD 


HEAD: (-) trauma.


EYES: sclera anicteric, conjunctiva clear. 


ENT: Ears normal, nares patent, oropharynx clear without exudates, moist mucous 

membranes.


NECK: Trachea midline, full range of motion, supple. 


LUNGS: Bilateral crackles and rhonchi  


HEART: 2/6 systolic ejection murmur, Regular rate and rhythm, S1, S2 without 

murmur, rub or gallop.


ABDOMEN: Soft, NT, ND, (+) BS, no guarding, no rebound, no hepatosplenomegaly, 

no masses.


EXTREMITIES: 2+ pulses, warm  


NEUROLOGICAL: somnolent, non-focal 


PSYCH: Flat affect





 Laboratory Results - last 24 hr











  03/14/18 03/14/18





  06:20 06:20


 


Iron   10 L


 


TIBC   226 L


 


Iron Saturation   4 L


 


Transferrin  179 L 











 Problem List 





- Problems


(1) H/O TB (tuberculosis)


Code(s): Z86.11 - PERSONAL HISTORY OF TUBERCULOSIS   





(2) Alcohol dependence with uncomplicated withdrawal


Code(s): F10.230 - ALCOHOL DEPENDENCE WITH WITHDRAWAL, UNCOMPLICATED   





(3) Ascending aortic aneurysm


Code(s): I71.2 - THORACIC AORTIC ANEURYSM, WITHOUT RUPTURE   





(4) Cocaine dependence


Code(s): F14.20 - COCAINE DEPENDENCE, UNCOMPLICATED   





(5) CHF (congestive heart failure)


Code(s): I50.9 - HEART FAILURE, UNSPECIFIED   





(6) HTN (hypertension)


Code(s): I10 - ESSENTIAL (PRIMARY) HYPERTENSION   


Qualifiers: 


   Hypertension type: essential hypertension   Qualified Code(s): I10 - 

Essential (primary) hypertension   





(7) Nicotine dependence


Code(s): F17.200 - NICOTINE DEPENDENCE, UNSPECIFIED, UNCOMPLICATED   


Qualifiers: 


   Nicotine product type: cigarettes   Substance use status: uncomplicated   

Qualified Code(s): F17.210 - Nicotine dependence, cigarettes, uncomplicated   





(8) Status post left hip replacement


Code(s): Z96.642 - PRESENCE OF LEFT ARTIFICIAL HIP JOINT   





(9) History of repair of dissecting aneurysm of descending thoracic aorta


Code(s): Z98.890 - OTHER SPECIFIED POSTPROCEDURAL STATES; Z86.79 - PERSONAL 

HISTORY OF OTHER DISEASES OF THE CIRCULATORY SYSTEM   





IMP BILATERAL PATCHY UPPER LOBE INFILTRATES >CANNOT EXCLUDE TB REACTIVATION


      H/O TB


      AAA S/P STENT


      ASHD


      CHF


      SUBSTANCE ABUSE


     YONATHAN





PLAN  SPUTUM AFB x 3 


         ISOLATION


         FOLLOW CULTURES


         POSSIBLE FLEX BRONCH NEXT WEEK IF SPUTUMS ARE UNREVEALING 


         DETOX PROTOCOL





DR CONTRERAS

## 2018-03-15 NOTE — EKG
Test Reason : 

Blood Pressure : ***/*** mmHG

Vent. Rate : 078 BPM     Atrial Rate : 078 BPM

   P-R Int : 154 ms          QRS Dur : 092 ms

    QT Int : 410 ms       P-R-T Axes : 047 032 031 degrees

   QTc Int : 467 ms

 

NORMAL SINUS RHYTHM

VOLTAGE CRITERIA FOR LEFT VENTRICULAR HYPERTROPHY

ABNORMAL ECG

WHEN COMPARED WITH ECG OF 15-MAR-2018 09:12,

NO SIGNIFICANT CHANGE WAS FOUND

Confirmed by FORREST LUBIN MD ([id number]) on 3/15/2018 12:33:48 PM

 

Referred By:             Confirmed By:FORREST LUBIN MD

## 2018-03-15 NOTE — PN
Teaching Attending Note


Name of Resident: Manav Tucker





ATTENDING PHYSICIAN STATEMENT





I saw and evaluated the patient.


I reviewed the resident's note and discussed the case with the resident.


I agree with the resident's findings and plan as documented.








SUBJECTIVE: Patient ambulating in room. He has no complaints and says 

"everything is ok as far as my health."








OBJECTIVE:


 Vital Signs











 Period  Temp  Pulse  Resp  BP Sys/Ang  Pulse Ox


 


 Last 24 Hr  98.2 F-98.7 F    18-20  /62-78  98








HEART: S1S2, RRR, (+) 2/6 SM


LUNGS: Scattered crackles


ABDOMEN: Soft, non-tender, non-distended, normal BS


EXTREMITIES: No edema 








 Laboratory Results - last 24 hr











  03/14/18 03/14/18 03/15/18





  06:20 06:20 10:18


 


WBC    9.0


 


RBC    3.80 L


 


Hgb    9.3 L


 


Hct    29.3 L


 


MCV    77.0 L


 


MCH    24.6 L


 


MCHC    31.9 L


 


RDW    20.9 H


 


Plt Count    687 H


 


MPV    7.7


 


Sodium   


 


Potassium   


 


Chloride   


 


Carbon Dioxide   


 


Anion Gap   


 


BUN   


 


Creatinine   


 


Creat Clearance w eGFR   


 


Random Glucose   


 


Calcium   


 


Iron   10 L 


 


TIBC   226 L 


 


Iron Saturation   4 L 


 


Transferrin  179 L  


 


Total Bilirubin   


 


AST   


 


ALT   


 


Alkaline Phosphatase   


 


Total Protein   


 


Albumin   














  03/15/18





  10:18


 


WBC 


 


RBC 


 


Hgb 


 


Hct 


 


MCV 


 


MCH 


 


MCHC 


 


RDW 


 


Plt Count 


 


MPV 


 


Sodium  139


 


Potassium  5.5 H


 


Chloride  104


 


Carbon Dioxide  25


 


Anion Gap  10


 


BUN  31 H


 


Creatinine  1.5 H


 


Creat Clearance w eGFR  49.54


 


Random Glucose  72 L


 


Calcium  8.6


 


Iron 


 


TIBC 


 


Iron Saturation 


 


Transferrin 


 


Total Bilirubin  0.2  D


 


AST  12 L


 


ALT  9 L D


 


Alkaline Phosphatase  115


 


Total Protein  6.9


 


Albumin  2.7 L








 Current Medications











Generic Name Dose Route Start Last Admin





  Trade Name Freq  PRN Reason Stop Dose Admin


 


Amlodipine Besylate  10 mg  03/13/18 21:30  03/15/18 09:54





  Norvasc -  PO   10 mg





  DAILY [facility]   Administration


 


Aspirin  81 mg  03/13/18 21:30  03/15/18 09:54





  Asa -  PO   81 mg





  DAILY [facility]   Administration


 


Carvedilol  25 mg  03/13/18 22:00  03/15/18 09:54





  Coreg -  PO   25 mg





  BID [facility]   Administration


 


Diazepam  5 mg  03/14/18 10:45  03/15/18 10:00





  Valium -  PO   5 mg





  Q6H PRN   Administration





  WITHDRAWAL(CONT SUBST)   


 


Docusate Sodium  100 mg  03/13/18 20:27  





  Colace -  PO   





  BID PRN   





  CONSTIPATION   


 


Escitalopram Oxalate  20 mg  03/13/18 21:30  03/15/18 09:54





  Lexapro -  PO   20 mg





  DAILY [facility]   Administration


 


Heparin Sodium (Porcine)  5,000 unit  03/13/18 20:30  03/15/18 09:54





  Heparin -  SQ   5,000 unit





  Q8H-IV [facility]   Administration


 


Methadone HCl  10 mg  03/14/18 11:00  03/15/18 06:10





  Dolophine -  PO  03/20/18 10:59  10 mg





  DAILY@0600 [facility]   Administration


 


Polyethylene Glycol  17 gm  03/13/18 20:27  





  Miralax (For Daily Use) -  PO   





  DAILY PRN   





  CONSTIPATION   


 


Prenatal Multivit/Folic Acid/Iron  1 tab  03/14/18 10:00  03/15/18 09:54





  Prenatal Vitamins (Sjr) -  PO   1 tab





  DAILY [facility]   Administration


 


Thiamine HCl  100 mg  03/14/18 22:00  03/14/18 22:15





  Vitamin B1 -  PO   100 mg





  [facility]   Administration


 


Trazodone HCl  50 mg  03/13/18 22:00  03/14/18 22:15





  Desyrel -  PO   50 mg





  [facility]   Administration


 


Zolpidem Tartrate  10 mg  03/14/18 11:08  





  Ambien -  PO   





  [facility] PRN   





  INSOMNIA   














ASSESSMENT AND PLAN:


This is a 48 year old man with a history of AAA, depression, CHF, opioid 

dependence who was sent to the ED from [facility] for positive PPD, abnormal CXR

, leukocytosis, anemia.





1. 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


   - Sputum AFB, quantiferon gold pending


   - Maintain isolation


2. Opioid dependence


   - Continue Methadone


3. Continuous alcohol dependence


   - Continue Valium as needed for withdrawal


   - Continue thiamine, folic acid, MVI


4. Acute kidney injury on stage 3 CKD


   - Improving


5. HTN


   - Continue Norvasc, Coreg


6. History of CHF


7. History of AAA and endovascular repair


8. Nicotine dependence


9. Depression


   - Continue Lexapro, Trazodone


10. Acute anemia on anemia secondary to chronic illness


   - Check stool occult blood


   - Hemoglobin is stable